# Patient Record
Sex: MALE | Race: BLACK OR AFRICAN AMERICAN | NOT HISPANIC OR LATINO | Employment: UNEMPLOYED | URBAN - METROPOLITAN AREA
[De-identification: names, ages, dates, MRNs, and addresses within clinical notes are randomized per-mention and may not be internally consistent; named-entity substitution may affect disease eponyms.]

---

## 2018-04-16 ENCOUNTER — HOSPITAL ENCOUNTER (EMERGENCY)
Facility: HOSPITAL | Age: 33
Discharge: HOME | End: 2018-04-17
Attending: EMERGENCY MEDICINE
Payer: MEDICAID

## 2018-04-16 DIAGNOSIS — R03.0 ELEVATED BLOOD PRESSURE READING: ICD-10-CM

## 2018-04-16 DIAGNOSIS — R19.7 DIARRHEA, UNSPECIFIED TYPE: Primary | ICD-10-CM

## 2018-04-16 DIAGNOSIS — R10.9 ABDOMINAL CRAMPS: ICD-10-CM

## 2018-04-16 PROCEDURE — 99283 EMERGENCY DEPT VISIT LOW MDM: CPT

## 2018-04-17 VITALS
HEIGHT: 71 IN | RESPIRATION RATE: 15 BRPM | OXYGEN SATURATION: 100 % | SYSTOLIC BLOOD PRESSURE: 177 MMHG | BODY MASS INDEX: 32.2 KG/M2 | HEART RATE: 66 BPM | WEIGHT: 230 LBS | TEMPERATURE: 99 F | DIASTOLIC BLOOD PRESSURE: 104 MMHG

## 2018-04-17 LAB
BILIRUB UR QL STRIP: NEGATIVE
C TRACH DNA SPEC QL NAA+PROBE: DETECTED
C TRACH DNA SPEC QL NAA+PROBE: DETECTED
CLARITY UR: CLEAR
COLOR UR: YELLOW
GLUCOSE UR QL STRIP: NEGATIVE
HGB UR QL STRIP: NEGATIVE
KETONES UR QL STRIP: NEGATIVE
LEUKOCYTE ESTERASE UR QL STRIP: NEGATIVE
N GONORRHOEA DNA SPEC QL NAA+PROBE: NOT DETECTED
NITRITE UR QL STRIP: NEGATIVE
PH UR STRIP: 6 [PH] (ref 5–8)
PROT UR QL STRIP: NEGATIVE
SP GR UR STRIP: 1.02 (ref 1–1.03)
URN SPEC COLLECT METH UR: NORMAL
UROBILINOGEN UR STRIP-ACNC: NEGATIVE EU/DL

## 2018-04-17 PROCEDURE — 87491 CHLMYD TRACH DNA AMP PROBE: CPT

## 2018-04-17 PROCEDURE — 81003 URINALYSIS AUTO W/O SCOPE: CPT

## 2018-04-17 PROCEDURE — 25000003 PHARM REV CODE 250: Performed by: EMERGENCY MEDICINE

## 2018-04-17 RX ORDER — DIPHENOXYLATE HYDROCHLORIDE AND ATROPINE SULFATE 2.5; .025 MG/1; MG/1
1 TABLET ORAL 4 TIMES DAILY PRN
Qty: 12 TABLET | Refills: 1 | Status: SHIPPED | OUTPATIENT
Start: 2018-04-17 | End: 2018-04-27

## 2018-04-17 RX ORDER — DIPHENOXYLATE HYDROCHLORIDE AND ATROPINE SULFATE 2.5; .025 MG/1; MG/1
1 TABLET ORAL
Status: COMPLETED | OUTPATIENT
Start: 2018-04-17 | End: 2018-04-17

## 2018-04-17 RX ORDER — DICYCLOMINE HYDROCHLORIDE 10 MG/1
20 CAPSULE ORAL
Status: COMPLETED | OUTPATIENT
Start: 2018-04-17 | End: 2018-04-17

## 2018-04-17 RX ORDER — DICYCLOMINE HYDROCHLORIDE 20 MG/1
20 TABLET ORAL EVERY 6 HOURS
Qty: 30 TABLET | Refills: 0 | Status: SHIPPED | OUTPATIENT
Start: 2018-04-17 | End: 2019-05-07 | Stop reason: DRUGHIGH

## 2018-04-17 RX ADMIN — DICYCLOMINE HYDROCHLORIDE 20 MG: 10 CAPSULE ORAL at 01:04

## 2018-04-17 RX ADMIN — DIPHENOXYLATE HYDROCHLORIDE AND ATROPINE SULFATE 1 TABLET: 2.5; .025 TABLET ORAL at 01:04

## 2018-04-17 NOTE — ED NOTES
"Pt presents to ED c/o multiple episode of diarrhea X2-3 days. Pt reports "discomfort" with urination X 2 weeks. Pt denies nausea/ vomiting. Pt reports abd pain to lower abd intermittently throughout the day, states present "when it bubbles, when I have to go to the bathroom".   "

## 2018-04-17 NOTE — ED PROVIDER NOTES
Encounter Date: 4/16/2018    SCRIBE #1 NOTE: I, Sierra Rowland, am scribing for, and in the presence of, Dr. Camejo.       History     Chief Complaint   Patient presents with    Diarrhea     abdominal cramping with diarrhea x4 days.      Mario Heller is a 33 y.o. male who  has no past medical history on file.    CC: diarrhea    The patient presents to the ED due to watery, non bloody diarrhea for 4 days, worse tonight.  Pt states he has been having to go to the restroom at work all day today.  He reports associated oscasional abdominal cramping, usually right before he needs to go to the restroom.  He denies any fever, n/v or any other symptoms.  He states he has been drinking a lot of Gatorade and water.  He states no one else at home has the same symptoms.  He denies any recent travel.  No other complaints at this time.        The history is provided by the patient.     Review of patient's allergies indicates:  No Known Allergies  History reviewed. No pertinent past medical history.  History reviewed. No pertinent surgical history.  History reviewed. No pertinent family history.  Social History   Substance Use Topics    Smoking status: Former Smoker     Packs/day: 0.50    Smokeless tobacco: Not on file    Alcohol use No     Review of Systems   Constitutional: Negative for fever.   HENT: Negative for sore throat.    Respiratory: Negative for cough and shortness of breath.    Cardiovascular: Negative for chest pain.   Gastrointestinal: Positive for abdominal pain and diarrhea. Negative for blood in stool, nausea and vomiting.   Genitourinary: Positive for dysuria (occasional. mild). Negative for discharge.   Musculoskeletal: Negative for back pain.   Neurological: Negative for dizziness and weakness.       Physical Exam     Initial Vitals [04/16/18 2220]   BP Pulse Resp Temp SpO2   (!) 163/81 69 20 98 °F (36.7 °C) 97 %      MAP       108.33         Physical Exam    Nursing note and vitals  reviewed.  Constitutional: He appears well-developed and well-nourished. He is not diaphoretic. No distress.   HENT:   Head: Normocephalic and atraumatic.   Eyes: Conjunctivae are normal.   Neck: Normal range of motion.   Cardiovascular: Normal rate, regular rhythm and normal heart sounds.   Pulmonary/Chest: Breath sounds normal. No respiratory distress.   Abdominal: Soft. Bowel sounds are normal. There is no tenderness.   Musculoskeletal: Normal range of motion. He exhibits no edema or tenderness.   Neurological: He is alert and oriented to person, place, and time.   Skin: Skin is warm and dry.   Psychiatric: He has a normal mood and affect.         ED Course   Procedures  Labs Reviewed   C. TRACHOMATIS/N. GONORRHOEAE BY AMP DNA   CHLAMYDIA TRACHOMATIS, DNA, AMP PROBE   C. TRACHOMATIS/N. GONORRHOEAE BY AMP DNA   URINALYSIS             Medical Decision Making:   Initial Assessment:   33 y.o. male presents with diarrhea for 4 days.  Denies n/v. Exam benign.    Clinical Tests:   Lab Tests: Ordered and Reviewed  ED Management:  Will order UA and take orthostatic BP.  Will give bentyl and lomotil and reassess.    Patient's urinalysis was completely normal.  There was no evidence of a UTI.  Urine was sent for GC and chlamydia since patient does report mild dysuria.  Patient has not had diarrhea while in the ED.  He will be given Bentyl and Lomotil here and discharged on these medications.  Patient's blood pressure was elevated.  He was advised to check his blood pressure daily and record.  He will be referred to primary care physician for further evaluation.  He has no evidence of end organ damage.     Labs are unremarkable.  Pt will be d/c home on lomotil and bentyl.                        Clinical Impression:   The primary encounter diagnosis was Diarrhea, unspecified type. Diagnoses of Abdominal cramps and Elevated blood pressure reading were also pertinent to this visit.    Disposition:   Disposition:  Discharged  Condition: Stable            I, Dr. Graciela Camejo, personally performed the services described in this documentation. All medical record entries made by the scribe were at my direction and in my presence.  I have reviewed the chart and agree that the record reflects my personal performance and is accurate and complete. Graciela Camejo MD.  3:27 AM 04/17/2018                 Graciela Camejo MD  04/17/18 0327       Graciela Camejo MD  04/20/18 1047

## 2018-04-17 NOTE — ED NOTES
"Pt inquires if testing has come back normal and asks, "So I don't have a STD?" Pt informed this testing was not performed.   "

## 2018-04-18 NOTE — PROGRESS NOTES
Pt +chlamydia. Pt was not treated in the ED.  Pt notified of results by phone and he reports he will f/u with [facility].

## 2019-05-07 ENCOUNTER — HOSPITAL ENCOUNTER (INPATIENT)
Facility: HOSPITAL | Age: 34
LOS: 2 days | Discharge: HOME | DRG: 392 | End: 2019-05-09
Attending: EMERGENCY MEDICINE | Admitting: FAMILY MEDICINE
Payer: MEDICAID

## 2019-05-07 DIAGNOSIS — R50.81 FEVER IN OTHER DISEASES: ICD-10-CM

## 2019-05-07 DIAGNOSIS — R10.84 GENERALIZED ABDOMINAL PAIN: ICD-10-CM

## 2019-05-07 DIAGNOSIS — K52.9 COLITIS: ICD-10-CM

## 2019-05-07 DIAGNOSIS — R07.9 CHEST PAIN: ICD-10-CM

## 2019-05-07 DIAGNOSIS — R19.7 VOMITING AND DIARRHEA: ICD-10-CM

## 2019-05-07 DIAGNOSIS — K52.9 ACUTE COLITIS: Primary | ICD-10-CM

## 2019-05-07 DIAGNOSIS — F19.10 DRUG ABUSE: ICD-10-CM

## 2019-05-07 DIAGNOSIS — R11.10 VOMITING AND DIARRHEA: ICD-10-CM

## 2019-05-07 LAB
ALBUMIN SERPL BCP-MCNC: 4.2 G/DL (ref 3.5–5.2)
ALP SERPL-CCNC: 67 U/L (ref 55–135)
ALT SERPL W/O P-5'-P-CCNC: 17 U/L (ref 10–44)
ANION GAP SERPL CALC-SCNC: 10 MMOL/L (ref 8–16)
AST SERPL-CCNC: 23 U/L (ref 10–40)
BASOPHILS # BLD AUTO: 0 K/UL (ref 0–0.2)
BASOPHILS NFR BLD: 0 % (ref 0–1.9)
BILIRUB SERPL-MCNC: 0.7 MG/DL (ref 0.1–1)
BUN SERPL-MCNC: 13 MG/DL (ref 6–20)
CALCIUM SERPL-MCNC: 9.5 MG/DL (ref 8.7–10.5)
CHLORIDE SERPL-SCNC: 103 MMOL/L (ref 95–110)
CO2 SERPL-SCNC: 25 MMOL/L (ref 23–29)
CREAT SERPL-MCNC: 1.2 MG/DL (ref 0.5–1.4)
DIFFERENTIAL METHOD: ABNORMAL
EOSINOPHIL # BLD AUTO: 0 K/UL (ref 0–0.5)
EOSINOPHIL NFR BLD: 0 % (ref 0–8)
ERYTHROCYTE [DISTWIDTH] IN BLOOD BY AUTOMATED COUNT: 13.7 % (ref 11.5–14.5)
EST. GFR  (AFRICAN AMERICAN): >60 ML/MIN/1.73 M^2
EST. GFR  (NON AFRICAN AMERICAN): >60 ML/MIN/1.73 M^2
GLUCOSE SERPL-MCNC: 94 MG/DL (ref 70–110)
HCT VFR BLD AUTO: 45.1 % (ref 40–54)
HGB BLD-MCNC: 15.3 G/DL (ref 14–18)
LACTATE SERPL-SCNC: 1.2 MMOL/L (ref 0.5–2.2)
LYMPHOCYTES # BLD AUTO: 0.8 K/UL (ref 1–4.8)
LYMPHOCYTES NFR BLD: 12.6 % (ref 18–48)
MCH RBC QN AUTO: 32.1 PG (ref 27–31)
MCHC RBC AUTO-ENTMCNC: 33.9 G/DL (ref 32–36)
MCV RBC AUTO: 95 FL (ref 82–98)
MONOCYTES # BLD AUTO: 0.2 K/UL (ref 0.3–1)
MONOCYTES NFR BLD: 3.8 % (ref 4–15)
NEUTROPHILS # BLD AUTO: 5.1 K/UL (ref 1.8–7.7)
NEUTROPHILS NFR BLD: 83.4 % (ref 38–73)
PLATELET # BLD AUTO: 113 K/UL (ref 150–350)
PMV BLD AUTO: 12.6 FL (ref 9.2–12.9)
POTASSIUM SERPL-SCNC: 4.5 MMOL/L (ref 3.5–5.1)
PROT SERPL-MCNC: 7.3 G/DL (ref 6–8.4)
RBC # BLD AUTO: 4.77 M/UL (ref 4.6–6.2)
SODIUM SERPL-SCNC: 138 MMOL/L (ref 136–145)
TROPONIN I SERPL DL<=0.01 NG/ML-MCNC: <0.006 NG/ML (ref 0–0.03)
WBC # BLD AUTO: 6.1 K/UL (ref 3.9–12.7)

## 2019-05-07 PROCEDURE — G0378 HOSPITAL OBSERVATION PER HR: HCPCS

## 2019-05-07 PROCEDURE — 96365 THER/PROPH/DIAG IV INF INIT: CPT

## 2019-05-07 PROCEDURE — 12000002 HC ACUTE/MED SURGE SEMI-PRIVATE ROOM

## 2019-05-07 PROCEDURE — 84484 ASSAY OF TROPONIN QUANT: CPT

## 2019-05-07 PROCEDURE — 83605 ASSAY OF LACTIC ACID: CPT

## 2019-05-07 PROCEDURE — 25000003 PHARM REV CODE 250: Performed by: EMERGENCY MEDICINE

## 2019-05-07 PROCEDURE — 93010 ELECTROCARDIOGRAM REPORT: CPT | Mod: ,,, | Performed by: STUDENT IN AN ORGANIZED HEALTH CARE EDUCATION/TRAINING PROGRAM

## 2019-05-07 PROCEDURE — 93005 ELECTROCARDIOGRAM TRACING: CPT

## 2019-05-07 PROCEDURE — 93010 EKG 12-LEAD: ICD-10-PCS | Mod: ,,, | Performed by: STUDENT IN AN ORGANIZED HEALTH CARE EDUCATION/TRAINING PROGRAM

## 2019-05-07 PROCEDURE — 99285 EMERGENCY DEPT VISIT HI MDM: CPT | Mod: 25

## 2019-05-07 PROCEDURE — 96361 HYDRATE IV INFUSION ADD-ON: CPT

## 2019-05-07 PROCEDURE — 85025 COMPLETE CBC W/AUTO DIFF WBC: CPT

## 2019-05-07 PROCEDURE — 80053 COMPREHEN METABOLIC PANEL: CPT

## 2019-05-07 PROCEDURE — 25500020 PHARM REV CODE 255: Performed by: EMERGENCY MEDICINE

## 2019-05-07 PROCEDURE — 87040 BLOOD CULTURE FOR BACTERIA: CPT

## 2019-05-07 PROCEDURE — 96375 TX/PRO/DX INJ NEW DRUG ADDON: CPT

## 2019-05-07 PROCEDURE — 63600175 PHARM REV CODE 636 W HCPCS: Performed by: EMERGENCY MEDICINE

## 2019-05-07 PROCEDURE — S0030 INJECTION, METRONIDAZOLE: HCPCS | Performed by: EMERGENCY MEDICINE

## 2019-05-07 RX ORDER — ONDANSETRON 2 MG/ML
4 INJECTION INTRAMUSCULAR; INTRAVENOUS
Status: COMPLETED | OUTPATIENT
Start: 2019-05-07 | End: 2019-05-07

## 2019-05-07 RX ORDER — ACETAMINOPHEN 500 MG
1000 TABLET ORAL
Status: COMPLETED | OUTPATIENT
Start: 2019-05-07 | End: 2019-05-07

## 2019-05-07 RX ORDER — ONDANSETRON 4 MG/1
4 TABLET, FILM COATED ORAL EVERY 8 HOURS PRN
Qty: 12 TABLET | Refills: 0 | Status: SHIPPED | OUTPATIENT
Start: 2019-05-07 | End: 2019-05-09 | Stop reason: SDUPTHER

## 2019-05-07 RX ORDER — KETOROLAC TROMETHAMINE 30 MG/ML
15 INJECTION, SOLUTION INTRAMUSCULAR; INTRAVENOUS
Status: COMPLETED | OUTPATIENT
Start: 2019-05-07 | End: 2019-05-07

## 2019-05-07 RX ORDER — DICYCLOMINE HYDROCHLORIDE 20 MG/1
20 TABLET ORAL 4 TIMES DAILY PRN
Qty: 20 TABLET | Refills: 0 | Status: SHIPPED | OUTPATIENT
Start: 2019-05-07 | End: 2019-05-09 | Stop reason: SDUPTHER

## 2019-05-07 RX ORDER — CIPROFLOXACIN 2 MG/ML
400 INJECTION, SOLUTION INTRAVENOUS
Status: COMPLETED | OUTPATIENT
Start: 2019-05-07 | End: 2019-05-08

## 2019-05-07 RX ORDER — HYOSCYAMINE SULFATE 0.5 MG/ML
0.5 INJECTION, SOLUTION SUBCUTANEOUS
Status: COMPLETED | OUTPATIENT
Start: 2019-05-07 | End: 2019-05-07

## 2019-05-07 RX ORDER — ACETAMINOPHEN 500 MG
500 TABLET ORAL EVERY 6 HOURS PRN
Qty: 28 TABLET | Refills: 0 | Status: SHIPPED | OUTPATIENT
Start: 2019-05-07

## 2019-05-07 RX ORDER — METRONIDAZOLE 500 MG/100ML
500 INJECTION, SOLUTION INTRAVENOUS
Status: COMPLETED | OUTPATIENT
Start: 2019-05-07 | End: 2019-05-08

## 2019-05-07 RX ORDER — DEXTROSE MONOHYDRATE AND SODIUM CHLORIDE 5; .9 G/100ML; G/100ML
1000 INJECTION, SOLUTION INTRAVENOUS
Status: COMPLETED | OUTPATIENT
Start: 2019-05-07 | End: 2019-05-08

## 2019-05-07 RX ADMIN — KETOROLAC TROMETHAMINE 15 MG: 30 INJECTION, SOLUTION INTRAMUSCULAR at 09:05

## 2019-05-07 RX ADMIN — METRONIDAZOLE 500 MG: 500 INJECTION, SOLUTION INTRAVENOUS at 11:05

## 2019-05-07 RX ADMIN — SODIUM CHLORIDE 1000 ML: 0.9 INJECTION, SOLUTION INTRAVENOUS at 09:05

## 2019-05-07 RX ADMIN — SODIUM CHLORIDE 1000 ML: 0.9 INJECTION, SOLUTION INTRAVENOUS at 08:05

## 2019-05-07 RX ADMIN — ONDANSETRON 4 MG: 2 INJECTION INTRAMUSCULAR; INTRAVENOUS at 08:05

## 2019-05-07 RX ADMIN — ACETAMINOPHEN 1000 MG: 500 TABLET ORAL at 11:05

## 2019-05-07 RX ADMIN — IOHEXOL 100 ML: 350 INJECTION, SOLUTION INTRAVENOUS at 09:05

## 2019-05-07 RX ADMIN — HYOSCYAMINE SULFATE 0.5 MG: 0.5 INJECTION, SOLUTION SUBCUTANEOUS at 08:05

## 2019-05-07 RX ADMIN — PROMETHAZINE HYDROCHLORIDE 12.5 MG: 25 INJECTION INTRAMUSCULAR; INTRAVENOUS at 09:05

## 2019-05-07 RX ADMIN — SODIUM CHLORIDE 1000 ML: 0.9 INJECTION, SOLUTION INTRAVENOUS at 11:05

## 2019-05-08 ENCOUNTER — CLINICAL SUPPORT (OUTPATIENT)
Dept: SMOKING CESSATION | Facility: CLINIC | Age: 34
End: 2019-05-08
Payer: MEDICAID

## 2019-05-08 DIAGNOSIS — F17.210 CIGARETTE SMOKER: Primary | ICD-10-CM

## 2019-05-08 PROBLEM — D69.6 THROMBOCYTOPENIA: Status: ACTIVE | Noted: 2019-05-08

## 2019-05-08 PROBLEM — N17.9 AKI (ACUTE KIDNEY INJURY): Status: ACTIVE | Noted: 2019-05-08

## 2019-05-08 PROBLEM — R19.7 VOMITING AND DIARRHEA: Status: ACTIVE | Noted: 2019-05-08

## 2019-05-08 PROBLEM — F17.200 TOBACCO USE DISORDER: Status: ACTIVE | Noted: 2019-05-08

## 2019-05-08 PROBLEM — R11.10 VOMITING AND DIARRHEA: Status: ACTIVE | Noted: 2019-05-08

## 2019-05-08 LAB
ALBUMIN SERPL BCP-MCNC: 3.3 G/DL (ref 3.5–5.2)
ALP SERPL-CCNC: 54 U/L (ref 55–135)
ALT SERPL W/O P-5'-P-CCNC: 13 U/L (ref 10–44)
AMPHET+METHAMPHET UR QL: NEGATIVE
ANION GAP SERPL CALC-SCNC: 9 MMOL/L (ref 8–16)
APTT BLDCRRT: 36.6 SEC (ref 21–32)
AST SERPL-CCNC: 19 U/L (ref 10–40)
BARBITURATES UR QL SCN>200 NG/ML: NEGATIVE
BASOPHILS # BLD AUTO: 0 K/UL (ref 0–0.2)
BASOPHILS NFR BLD: 0 % (ref 0–1.9)
BENZODIAZ UR QL SCN>200 NG/ML: NEGATIVE
BILIRUB SERPL-MCNC: 0.5 MG/DL (ref 0.1–1)
BILIRUB UR QL STRIP: NEGATIVE
BUN SERPL-MCNC: 10 MG/DL (ref 6–20)
BZE UR QL SCN: NORMAL
C DIFF GDH STL QL: NEGATIVE
C DIFF TOX A+B STL QL IA: NEGATIVE
CALCIUM SERPL-MCNC: 8.3 MG/DL (ref 8.7–10.5)
CANNABINOIDS UR QL SCN: NORMAL
CHLORIDE SERPL-SCNC: 107 MMOL/L (ref 95–110)
CLARITY UR: CLEAR
CO2 SERPL-SCNC: 22 MMOL/L (ref 23–29)
COLOR UR: YELLOW
CREAT SERPL-MCNC: 1.1 MG/DL (ref 0.5–1.4)
CREAT UR-MCNC: 155.5 MG/DL (ref 23–375)
DIFFERENTIAL METHOD: ABNORMAL
EOSINOPHIL # BLD AUTO: 0 K/UL (ref 0–0.5)
EOSINOPHIL NFR BLD: 0 % (ref 0–8)
ERYTHROCYTE [DISTWIDTH] IN BLOOD BY AUTOMATED COUNT: 13.8 % (ref 11.5–14.5)
EST. GFR  (AFRICAN AMERICAN): >60 ML/MIN/1.73 M^2
EST. GFR  (NON AFRICAN AMERICAN): >60 ML/MIN/1.73 M^2
ESTIMATED AVG GLUCOSE: 103 MG/DL (ref 68–131)
GLUCOSE SERPL-MCNC: 90 MG/DL (ref 70–110)
GLUCOSE UR QL STRIP: NEGATIVE
HBA1C MFR BLD HPLC: 5.2 % (ref 4–5.6)
HCT VFR BLD AUTO: 41.8 % (ref 40–54)
HGB BLD-MCNC: 14 G/DL (ref 14–18)
HGB UR QL STRIP: NEGATIVE
INR PPP: 1.3 (ref 0.8–1.2)
KETONES UR QL STRIP: NEGATIVE
LEUKOCYTE ESTERASE UR QL STRIP: NEGATIVE
LIPASE SERPL-CCNC: 8 U/L (ref 4–60)
LYMPHOCYTES # BLD AUTO: 0.6 K/UL (ref 1–4.8)
LYMPHOCYTES NFR BLD: 15.7 % (ref 18–48)
MAGNESIUM SERPL-MCNC: 1.4 MG/DL (ref 1.6–2.6)
MCH RBC QN AUTO: 31.7 PG (ref 27–31)
MCHC RBC AUTO-ENTMCNC: 33.5 G/DL (ref 32–36)
MCV RBC AUTO: 95 FL (ref 82–98)
METHADONE UR QL SCN>300 NG/ML: NEGATIVE
MONOCYTES # BLD AUTO: 0.3 K/UL (ref 0.3–1)
MONOCYTES NFR BLD: 7.6 % (ref 4–15)
NEUTROPHILS # BLD AUTO: 2.8 K/UL (ref 1.8–7.7)
NEUTROPHILS NFR BLD: 76.7 % (ref 38–73)
NITRITE UR QL STRIP: NEGATIVE
OB PNL STL: POSITIVE
OPIATES UR QL SCN: NEGATIVE
PCP UR QL SCN>25 NG/ML: NEGATIVE
PH UR STRIP: 6 [PH] (ref 5–8)
PHOSPHATE SERPL-MCNC: 2.5 MG/DL (ref 2.7–4.5)
PLATELET # BLD AUTO: 118 K/UL (ref 150–350)
PMV BLD AUTO: 13.2 FL (ref 9.2–12.9)
POTASSIUM SERPL-SCNC: 3.5 MMOL/L (ref 3.5–5.1)
PROCALCITONIN SERPL IA-MCNC: 0.08 NG/ML
PROT SERPL-MCNC: 6.2 G/DL (ref 6–8.4)
PROT UR QL STRIP: NEGATIVE
PROTHROMBIN TIME: 13.3 SEC (ref 9–12.5)
RBC # BLD AUTO: 4.42 M/UL (ref 4.6–6.2)
SODIUM SERPL-SCNC: 138 MMOL/L (ref 136–145)
SP GR UR STRIP: <=1.005 (ref 1–1.03)
T4 FREE SERPL-MCNC: 0.89 NG/DL (ref 0.71–1.51)
TOXICOLOGY INFORMATION: NORMAL
TSH SERPL DL<=0.005 MIU/L-ACNC: 0.34 UIU/ML (ref 0.4–4)
URN SPEC COLLECT METH UR: ABNORMAL
UROBILINOGEN UR STRIP-ACNC: NEGATIVE EU/DL
WBC # BLD AUTO: 3.69 K/UL (ref 3.9–12.7)
WBC #/AREA STL HPF: ABNORMAL /[HPF]

## 2019-05-08 PROCEDURE — 63600175 PHARM REV CODE 636 W HCPCS: Performed by: EMERGENCY MEDICINE

## 2019-05-08 PROCEDURE — 96376 TX/PRO/DX INJ SAME DRUG ADON: CPT

## 2019-05-08 PROCEDURE — 25000003 PHARM REV CODE 250: Performed by: EMERGENCY MEDICINE

## 2019-05-08 PROCEDURE — 81003 URINALYSIS AUTO W/O SCOPE: CPT | Mod: 59

## 2019-05-08 PROCEDURE — 96375 TX/PRO/DX INJ NEW DRUG ADDON: CPT

## 2019-05-08 PROCEDURE — S0030 INJECTION, METRONIDAZOLE: HCPCS | Performed by: STUDENT IN AN ORGANIZED HEALTH CARE EDUCATION/TRAINING PROGRAM

## 2019-05-08 PROCEDURE — 84439 ASSAY OF FREE THYROXINE: CPT

## 2019-05-08 PROCEDURE — 84145 PROCALCITONIN (PCT): CPT

## 2019-05-08 PROCEDURE — 80053 COMPREHEN METABOLIC PANEL: CPT

## 2019-05-08 PROCEDURE — 85610 PROTHROMBIN TIME: CPT

## 2019-05-08 PROCEDURE — 89055 LEUKOCYTE ASSESSMENT FECAL: CPT

## 2019-05-08 PROCEDURE — 96367 TX/PROPH/DG ADDL SEQ IV INF: CPT

## 2019-05-08 PROCEDURE — 87427 SHIGA-LIKE TOXIN AG IA: CPT | Mod: 59

## 2019-05-08 PROCEDURE — 94761 N-INVAS EAR/PLS OXIMETRY MLT: CPT

## 2019-05-08 PROCEDURE — 63600175 PHARM REV CODE 636 W HCPCS: Performed by: STUDENT IN AN ORGANIZED HEALTH CARE EDUCATION/TRAINING PROGRAM

## 2019-05-08 PROCEDURE — 11000001 HC ACUTE MED/SURG PRIVATE ROOM

## 2019-05-08 PROCEDURE — 80307 DRUG TEST PRSMV CHEM ANLYZR: CPT

## 2019-05-08 PROCEDURE — C9113 INJ PANTOPRAZOLE SODIUM, VIA: HCPCS | Performed by: STUDENT IN AN ORGANIZED HEALTH CARE EDUCATION/TRAINING PROGRAM

## 2019-05-08 PROCEDURE — 97161 PT EVAL LOW COMPLEX 20 MIN: CPT

## 2019-05-08 PROCEDURE — 83516 IMMUNOASSAY NONANTIBODY: CPT

## 2019-05-08 PROCEDURE — 92610 EVALUATE SWALLOWING FUNCTION: CPT

## 2019-05-08 PROCEDURE — 97165 OT EVAL LOW COMPLEX 30 MIN: CPT

## 2019-05-08 PROCEDURE — 83735 ASSAY OF MAGNESIUM: CPT

## 2019-05-08 PROCEDURE — 84443 ASSAY THYROID STIM HORMONE: CPT

## 2019-05-08 PROCEDURE — 99406 BEHAV CHNG SMOKING 3-10 MIN: CPT | Mod: S$GLB,,,

## 2019-05-08 PROCEDURE — 87045 FECES CULTURE AEROBIC BACT: CPT

## 2019-05-08 PROCEDURE — 87046 STOOL CULTR AEROBIC BACT EA: CPT

## 2019-05-08 PROCEDURE — 85730 THROMBOPLASTIN TIME PARTIAL: CPT

## 2019-05-08 PROCEDURE — 83690 ASSAY OF LIPASE: CPT

## 2019-05-08 PROCEDURE — 96366 THER/PROPH/DIAG IV INF ADDON: CPT

## 2019-05-08 PROCEDURE — 84100 ASSAY OF PHOSPHORUS: CPT

## 2019-05-08 PROCEDURE — 25000003 PHARM REV CODE 250: Performed by: STUDENT IN AN ORGANIZED HEALTH CARE EDUCATION/TRAINING PROGRAM

## 2019-05-08 PROCEDURE — 83036 HEMOGLOBIN GLYCOSYLATED A1C: CPT

## 2019-05-08 PROCEDURE — 36415 COLL VENOUS BLD VENIPUNCTURE: CPT

## 2019-05-08 PROCEDURE — 99406 PT REFUSED TOBACCO CESSATION: ICD-10-PCS | Mod: S$GLB,,,

## 2019-05-08 PROCEDURE — 96361 HYDRATE IV INFUSION ADD-ON: CPT

## 2019-05-08 PROCEDURE — 85025 COMPLETE CBC W/AUTO DIFF WBC: CPT

## 2019-05-08 PROCEDURE — 87209 SMEAR COMPLEX STAIN: CPT

## 2019-05-08 PROCEDURE — 87449 NOS EACH ORGANISM AG IA: CPT

## 2019-05-08 PROCEDURE — 82272 OCCULT BLD FECES 1-3 TESTS: CPT

## 2019-05-08 RX ORDER — IBUPROFEN 200 MG
16 TABLET ORAL
Status: DISCONTINUED | OUTPATIENT
Start: 2019-05-08 | End: 2019-05-09 | Stop reason: HOSPADM

## 2019-05-08 RX ORDER — DEXTROSE 50 % IN WATER (D50W) INTRAVENOUS SYRINGE
12.5
Status: DISCONTINUED | OUTPATIENT
Start: 2019-05-08 | End: 2019-05-09 | Stop reason: HOSPADM

## 2019-05-08 RX ORDER — SODIUM CHLORIDE 0.9 % (FLUSH) 0.9 %
5 SYRINGE (ML) INJECTION
Status: DISCONTINUED | OUTPATIENT
Start: 2019-05-08 | End: 2019-05-09 | Stop reason: HOSPADM

## 2019-05-08 RX ORDER — DEXTROSE 50 % IN WATER (D50W) INTRAVENOUS SYRINGE
25
Status: DISCONTINUED | OUTPATIENT
Start: 2019-05-08 | End: 2019-05-09 | Stop reason: HOSPADM

## 2019-05-08 RX ORDER — DICYCLOMINE HYDROCHLORIDE 20 MG/1
20 TABLET ORAL
Status: DISCONTINUED | OUTPATIENT
Start: 2019-05-08 | End: 2019-05-09 | Stop reason: HOSPADM

## 2019-05-08 RX ORDER — PANTOPRAZOLE SODIUM 40 MG/10ML
40 INJECTION, POWDER, LYOPHILIZED, FOR SOLUTION INTRAVENOUS DAILY
Status: DISCONTINUED | OUTPATIENT
Start: 2019-05-08 | End: 2019-05-09 | Stop reason: HOSPADM

## 2019-05-08 RX ORDER — CIPROFLOXACIN 2 MG/ML
400 INJECTION, SOLUTION INTRAVENOUS
Status: DISCONTINUED | OUTPATIENT
Start: 2019-05-08 | End: 2019-05-09 | Stop reason: HOSPADM

## 2019-05-08 RX ORDER — GLUCAGON 1 MG
1 KIT INJECTION
Status: DISCONTINUED | OUTPATIENT
Start: 2019-05-08 | End: 2019-05-09 | Stop reason: HOSPADM

## 2019-05-08 RX ORDER — INSULIN ASPART 100 [IU]/ML
0-5 INJECTION, SOLUTION INTRAVENOUS; SUBCUTANEOUS
Status: DISCONTINUED | OUTPATIENT
Start: 2019-05-08 | End: 2019-05-09 | Stop reason: HOSPADM

## 2019-05-08 RX ORDER — ADHESIVE BANDAGE
30 BANDAGE TOPICAL DAILY
Status: DISCONTINUED | OUTPATIENT
Start: 2019-05-08 | End: 2019-05-08

## 2019-05-08 RX ORDER — ACETAMINOPHEN 325 MG/1
650 TABLET ORAL EVERY 8 HOURS PRN
Status: DISCONTINUED | OUTPATIENT
Start: 2019-05-08 | End: 2019-05-08

## 2019-05-08 RX ORDER — SODIUM CHLORIDE, SODIUM LACTATE, POTASSIUM CHLORIDE, CALCIUM CHLORIDE 600; 310; 30; 20 MG/100ML; MG/100ML; MG/100ML; MG/100ML
INJECTION, SOLUTION INTRAVENOUS CONTINUOUS
Status: DISCONTINUED | OUTPATIENT
Start: 2019-05-08 | End: 2019-05-08

## 2019-05-08 RX ORDER — SODIUM CHLORIDE, SODIUM LACTATE, POTASSIUM CHLORIDE, CALCIUM CHLORIDE 600; 310; 30; 20 MG/100ML; MG/100ML; MG/100ML; MG/100ML
INJECTION, SOLUTION INTRAVENOUS CONTINUOUS
Status: ACTIVE | OUTPATIENT
Start: 2019-05-08 | End: 2019-05-08

## 2019-05-08 RX ORDER — IBUPROFEN 200 MG
24 TABLET ORAL
Status: DISCONTINUED | OUTPATIENT
Start: 2019-05-08 | End: 2019-05-09 | Stop reason: HOSPADM

## 2019-05-08 RX ORDER — RAMELTEON 8 MG/1
8 TABLET ORAL NIGHTLY PRN
Status: DISCONTINUED | OUTPATIENT
Start: 2019-05-08 | End: 2019-05-09 | Stop reason: HOSPADM

## 2019-05-08 RX ORDER — ACETAMINOPHEN 325 MG/1
650 TABLET ORAL EVERY 8 HOURS PRN
Status: DISCONTINUED | OUTPATIENT
Start: 2019-05-08 | End: 2019-05-09 | Stop reason: HOSPADM

## 2019-05-08 RX ORDER — METRONIDAZOLE 500 MG/100ML
500 INJECTION, SOLUTION INTRAVENOUS
Status: DISCONTINUED | OUTPATIENT
Start: 2019-05-08 | End: 2019-05-09 | Stop reason: HOSPADM

## 2019-05-08 RX ORDER — ONDANSETRON 8 MG/1
8 TABLET, ORALLY DISINTEGRATING ORAL EVERY 6 HOURS PRN
Status: DISCONTINUED | OUTPATIENT
Start: 2019-05-08 | End: 2019-05-09 | Stop reason: HOSPADM

## 2019-05-08 RX ORDER — ACETAMINOPHEN 325 MG/1
650 TABLET ORAL EVERY 4 HOURS PRN
Status: DISCONTINUED | OUTPATIENT
Start: 2019-05-08 | End: 2019-05-08 | Stop reason: ALTCHOICE

## 2019-05-08 RX ADMIN — SODIUM CHLORIDE, SODIUM LACTATE, POTASSIUM CHLORIDE, AND CALCIUM CHLORIDE 1000 ML: .6; .31; .03; .02 INJECTION, SOLUTION INTRAVENOUS at 02:05

## 2019-05-08 RX ADMIN — PANTOPRAZOLE SODIUM 40 MG: 40 INJECTION, POWDER, LYOPHILIZED, FOR SOLUTION INTRAVENOUS at 10:05

## 2019-05-08 RX ADMIN — THIAMINE HYDROCHLORIDE: 100 INJECTION, SOLUTION INTRAMUSCULAR; INTRAVENOUS at 04:05

## 2019-05-08 RX ADMIN — CIPROFLOXACIN 400 MG: 2 INJECTION, SOLUTION INTRAVENOUS at 12:05

## 2019-05-08 RX ADMIN — METRONIDAZOLE 500 MG: 500 INJECTION, SOLUTION INTRAVENOUS at 06:05

## 2019-05-08 RX ADMIN — SODIUM CHLORIDE, SODIUM LACTATE, POTASSIUM CHLORIDE, AND CALCIUM CHLORIDE: .6; .31; .03; .02 INJECTION, SOLUTION INTRAVENOUS at 10:05

## 2019-05-08 RX ADMIN — LACTOBACILLUS TAB 1 TABLET: TAB at 04:05

## 2019-05-08 RX ADMIN — LIDOCAINE HYDROCHLORIDE: 20 SOLUTION ORAL; TOPICAL at 02:05

## 2019-05-08 RX ADMIN — DICYCLOMINE HYDROCHLORIDE 20 MG: 20 TABLET ORAL at 12:05

## 2019-05-08 RX ADMIN — METRONIDAZOLE 500 MG: 500 INJECTION, SOLUTION INTRAVENOUS at 11:05

## 2019-05-08 RX ADMIN — LACTOBACILLUS TAB 1 TABLET: TAB at 12:05

## 2019-05-08 RX ADMIN — ACETAMINOPHEN 650 MG: 325 TABLET ORAL at 10:05

## 2019-05-08 RX ADMIN — METRONIDAZOLE 500 MG: 500 INJECTION, SOLUTION INTRAVENOUS at 04:05

## 2019-05-08 RX ADMIN — LACTOBACILLUS TAB 1 TABLET: TAB at 10:05

## 2019-05-08 RX ADMIN — DICYCLOMINE HYDROCHLORIDE 20 MG: 20 TABLET ORAL at 11:05

## 2019-05-08 RX ADMIN — DEXTROSE AND SODIUM CHLORIDE 1000 ML: 5; .9 INJECTION, SOLUTION INTRAVENOUS at 12:05

## 2019-05-08 RX ADMIN — DICYCLOMINE HYDROCHLORIDE 20 MG: 20 TABLET ORAL at 04:05

## 2019-05-08 NOTE — PROGRESS NOTES
Smoking cessation education and materials provided.  Pt reports nicotine withdrawal symptoms, however, declines the nicotine patch during hospital admission stating that he has used the patch in the past and it caused him to hallucinate. Pt referred to 1-800-QUIT-NOW for further smoking cessation resources following hospital discharge.

## 2019-05-08 NOTE — PLAN OF CARE
This  put name on white board and explained blue discharge folder to patient. Discharge planning brochure and/or business card given to patient.  Patient verbalized understanding.    TN met with patient. He states prior to arrival he was living at home. Pt states that he was independent and able to drive. Pts oscar's aunt is currently in room at bedside. Pt denies any needs for HH or DME. Pt states that his fiancee or brother will be bringing him home. Pt has no further questions at this time.      05/08/19 1014   Discharge Assessment   Assessment Type Discharge Planning Assessment   Confirmed/corrected address and phone number on facesheet? Yes   Assessment information obtained from? Patient;Caregiver;Medical Record   Expected Length of Stay (days) 2   Communicated expected length of stay with patient/caregiver yes   Prior to hospitilization cognitive status: Alert/Oriented   Prior to hospitalization functional status: Independent   Current cognitive status: Alert/Oriented   Current Functional Status: Independent   Facility Arrived From: ED   Lives With alone   Able to Return to Prior Arrangements yes   Who are your caregiver(s) and their phone number(s)? Edwin (brother) [phone]   Patient's perception of discharge disposition home or selfcare   Readmission Within the Last 30 Days no previous admission in last 30 days   Patient currently being followed by outpatient case management? No   Patient currently receives any other outside agency services? No   Equipment Currently Used at Home none   Do you have any problems affording any of your prescribed medications? No   Is the patient taking medications as prescribed? yes   Does the patient have transportation home? Yes   Transportation Anticipated family or friend will provide   Does the patient receive services at the [facility]? No   Discharge Plan A Home   DME Needed Upon Discharge  none   Patient/Family in Agreement with Plan yes

## 2019-05-08 NOTE — ED TRIAGE NOTES
Pt presented to ED via ambulance with c/o of vomiting , diarrhea , and fever , pt states after 3 days of drinking about a gallon in total of alcohol  ,1g of  cocaine and 2g  mariajuana on waking this am he felt sick to his stomach , pt took pepto bismol because pain increased , pt pain 8/10

## 2019-05-08 NOTE — PT/OT/SLP EVAL
Occupational Therapy   Evaluation and Discharge Note    Name: Mario Heller  MRN: [id number]  Admitting Diagnosis:  Acute colitis      Recommendations:     Discharge Recommendations: home  Discharge Equipment Recommendations:  none  Barriers to discharge:  None    Assessment:   Pt w/o signif change in fxnl status from baseline (I). No further OT svcs indicated at this time.    Mario Heller is a 34 y.o. male with a medical diagnosis of Acute colitis. At this time, patient is functioning at their prior level of function and does not require further acute OT services.     Plan:     During this hospitalization, patient does not require further acute OT services.  Please re-consult if situation changes.    · Plan of Care Reviewed with: patient, friend    Subjective     Chief Complaint: abd pain  Patient/Family Comments/goals: return home    Occupational Profile:  Living Environment: w/ friend in 1st fl apt w/ 3-4 ILIANA & 0HR; t/s combo  Previous level of function: (I) w/o DME  Roles and Routines: .  Equipment Used at home:  none  Assistance upon Discharge: friends    Pain/Comfort:  · Pain Rating 1: 9/10  · Location - Orientation 1: generalized  · Location 1: abdomen  · Pain Addressed 1: Reposition, Distraction, Cessation of Activity, Nurse notified  · Pain Rating Post-Intervention 1: 9/10  · Pain Rating 2: 9/10  · Location - Orientation 2: lower  · Location 2: back  · Pain Addressed 2: Reposition, Distraction, Cessation of Activity, Nurse notified(provided hot packs)    Patients cultural, spiritual, Yarsanism conflicts given the current situation:      Objective:     Communicated with: nsisaac prior to session.  Patient found right sidelying with bed alarm upon OT entry to room.    General Precautions: Standard, fall   Orthopedic Precautions:N/A   Braces: N/A     Occupational Performance:    Bed Mobility:    · Patient completed Supine to Sit with modified independence  · Patient completed Sit to Supine with modified  independence    Functional Mobility/Transfers:  · Patient completed Sit <> Stand Transfer with supervision  with  no assistive device   · Functional Mobility: w/o DME around room w/ Sup    Activities of Daily Living:  · Lower Body Dressing: modified independence don shorts & doff/don socks  · Toileting: modified independence standing    Cognitive/Visual Perceptual:  AOx4    Physical Exam:  BUEs WFL at 5/5    Sit balance: G  Stand balance: F+    AMPAC 6 Click ADL:  AMPAC Total Score: 24    Treatment & Education:    Education:  Pt found in R sidelying & agreeable to OT eval this AM. Pt lives w/ a friend in 1st fl apt w/ 3-4STE & 0HR; t/s combo. PLOF: (I) w/o DME w/ all fxnl tasks incl standing showers, IADLs & driving. Currently, pt c/o 8-9/10 abd & low back pain & perf all eval tasks w/ MI. Edu/tx re: general safety techs, deep/PLBing techs. Pt verbalized understanding. Pt returned to supine w/ bed alarm & nsg notified.    Patient left right sidelying with all lines intact, call button in reach, bed alarm on, nsg notified and friend present    GOALS:   Multidisciplinary Problems     Occupational Therapy Goals     Not on file          Multidisciplinary Problems (Resolved)        Problem: Occupational Therapy Goal    Goal Priority Disciplines Outcome Interventions   Occupational Therapy Goal   (Resolved)     OT, PT/OT Outcome(s) achieved                    History:     History reviewed. No pertinent past medical history.    History reviewed. No pertinent surgical history.    Time Tracking:     OT Date of Treatment: 05/08/19  OT Start Time: 0927  OT Stop Time: 0945  OT Total Time (min): 18 min    Billable Minutes:Evaluation 18  Total Time 18    SAUNDRA Prado  5/8/2019

## 2019-05-08 NOTE — ED PROVIDER NOTES
Encounter Date: 5/7/2019    SCRIBE #1 NOTE: I, Nkechi Castellon, am scribing for, and in the presence of,  Dr. Fischer. I have scribed the entire note.       History     Chief Complaint   Patient presents with    Abdominal Pain     Patient presents to the ED via [facility] with reports of having abdominal pain with nausea, vomiting, and fever. EMS reports patient was partying last night with alcohol and cocaine use.      This is a 34 y.o. male with no known PMHx who presents to the Emergency Department via EMS due to abdominal pain with nausea, vomiting, and diarrhea with an onset of this morning. Patient describes his abdominal pain as a shooting stabbing pain located to RUQ, RLQ, and LLQ. He reports of multiple episodes of diarrhea and emesis. He denies any hematemesis or blood in stool. Symptoms are associated with mild discomfort to his chest and fever. He denies any SOB or numbness to his extremities. Patient reports he was out partying last night and endorses EtOH, cocaine, and marijuana use last night; last cocaine use at 2 am this morning. EMS reports administering Zofran and fluid bolus while en route.     The history is provided by the patient.     Review of patient's allergies indicates:  No Known Allergies  No past medical history on file.  No past surgical history on file.  No family history on file.  Social History     Tobacco Use    Smoking status: Former Smoker     Packs/day: 0.50   Substance Use Topics    Alcohol use: No    Drug use: No     Review of Systems   Constitutional: Negative for chills and fever.   HENT: Negative for rhinorrhea, sore throat and trouble swallowing.    Eyes: Negative for visual disturbance.   Respiratory: Negative for cough and shortness of breath.    Cardiovascular: Positive for chest pain.   Gastrointestinal: Positive for abdominal pain, diarrhea, nausea and vomiting. Negative for blood in stool.   Genitourinary: Negative for dysuria and hematuria.   Musculoskeletal:  Negative for back pain.   Skin: Negative for rash.   Neurological: Negative for numbness and headaches.   Hematological: Negative for adenopathy.       Physical Exam     Initial Vitals [05/07/19 1928]   BP Pulse Resp Temp SpO2   (!) 158/78 78 18 (!) 101 °F (38.3 °C) 99 %      MAP       --         Physical Exam    Nursing note and vitals reviewed.  Constitutional: He appears well-developed and well-nourished. He is not diaphoretic. No distress.   HENT:   Head: Normocephalic and atraumatic.   Mouth/Throat: Oropharynx is clear and moist.   Eyes: EOM are normal. Pupils are equal, round, and reactive to light.   Neck: No tracheal deviation present.   Cardiovascular: Normal rate, regular rhythm, normal heart sounds and intact distal pulses.   Pulmonary/Chest: Breath sounds normal. No stridor. No respiratory distress.   Abdominal: Soft. He exhibits no distension and no mass. There is tenderness (generalized).   Musculoskeletal: Normal range of motion. He exhibits no edema.   Neurological: He is alert and oriented to person, place, and time. No cranial nerve deficit or sensory deficit.   Skin: Skin is warm and dry. Capillary refill takes less than 2 seconds. No rash noted.   Psychiatric: He has a normal mood and affect. His behavior is normal. Thought content normal.         ED Course   Procedures  Labs Reviewed   CULTURE, BLOOD   CULTURE, BLOOD   LACTIC ACID, PLASMA   DRUG SCREEN PANEL, URINE EMERGENCY   URINALYSIS, REFLEX TO URINE CULTURE   COMPREHENSIVE METABOLIC PANEL   CBC W/ AUTO DIFFERENTIAL   TROPONIN I          Imaging Results    None          Medical Decision Making:   Clinical Tests:   Lab Tests: Ordered and Reviewed  Radiological Study: Ordered and Reviewed  Medical Tests: Ordered and Reviewed  ED Management:  At time of shift change, patient's ED workup incomplete. Oncoming ED physician to continue care. All relevant details were discussed, including pending workup and planned disposition. See summary  below.    HPI: 33 yo male with n/v/d body aches after drinking and cocaine use. He arrivals febrile  Workup: labs ua uds ct scan  Pending: UA/ UDS/ CT scan  Disposition: anticipate discharge, pending results of CT scan and PO tolerance.       Update at 11:18 p.m. assumed care from Dr. Fischer at 10:15 p.m. CT scan results demonstrate pancolitis patient continues to be febrile to [place] [address] continues to complain of abdominal pain I feel he should be admitted for IV antibiotic stool cultures have been ordered this seems to be more of an infectious process.  The [facility] residents have been advised and will see the patient admit him.  There is no evidence of perforation appendicitis or pancreatitis patient's lactic acid is normal at this time as is his white blood cell count but as noted he has continued to complain of pain and be febrile.                     ED Course as of May 07 2224   Tue May 07, 2019   2126 EKG: Rate 86 NSR NO  ST changes.  Twave inversion lead 3    [RN]      ED Course User Index  [RN] Miles Fischer Jr., MD     Clinical Impression:   No diagnosis found.       I, Dr. Moreno Cavanaugh, personally performed the services described in this documentation.   All medical record entries made by the scribe were at my direction and in my presence.   I have reviewed the chart and agree that the record is accurate and complete.   Moreno Cavanaugh MD.  11:18 PM 05/07/2019                    Moreno Cavanaugh MD  05/07/19 9593

## 2019-05-08 NOTE — H&P
History & Physical  [facility]      SUBJECTIVE:     History of Present Illness:  Mr. Mario Heller is a 33 y/o M w/ no know pmhx who presents to [facility] ED via [facility] w/ c/o diffuse colicky abd pain, increased urgency, tenesmus, and occasional constipation. He endorses he has ~2 BM/day. At times nancy blood is noted on both stool and toilet paper. He denies h/o internal or external hemorrhoids, no FM IBD, no first degree relative w/ Colon CA. On 5/7 morning, he endorsed episodes of diarrhea w/ associated emesis. Furthermore, he describes symptoms of gastritis. He noted that the previous night he was drinking/parting. Although he denies coffee ground emesis or hematochezia.  Denies C/CP/SOB/HA/or new rash. He endorses that he does not take any prescription medication.     No medications prior to admission.       Review of patient's allergies indicates:  No Known Allergies    History reviewed. No pertinent past medical history.  History reviewed. No pertinent surgical history.  History reviewed. No pertinent family history.  Social History     Tobacco Use    Smoking status: Current Every Day Smoker     Packs/day: 1.00     Types: Cigarettes   Substance Use Topics    Alcohol use: Yes     Comment: Weekend drinker equals one gallon of liquor    Drug use: Yes     Types: Cocaine, Marijuana     Comment: past week         Review of Systems:  Constitutional: Negative for chills and fever.   HENT: Negative for rhinorrhea, sore throat and trouble swallowing.    Eyes: Negative for visual disturbance.   Respiratory: Negative for cough and shortness of breath.    Cardiovascular: Negative for chest pain.   Gastrointestinal: Positive for epigastric abdominal pain, diarrhea, nausea and vomiting.    Genitourinary: Negative for dysuria and hematuria.   Musculoskeletal: Negative for back pain.   Skin: Negative for rash.   Neurological: Negative for numbness and headaches.   Hematological: Negative for  adenopathy    OBJECTIVE:     Vital Signs (Most Recent)  Temp: (!) 100.9 °F (38.3 °C) (05/08/19 0206)  Pulse: 60 (05/08/19 0400)  Resp: 18 (05/08/19 0206)  BP: 126/71 (05/08/19 0206)  SpO2: 99 % (05/08/19 0046)    Physical Exam:  Constitutional:  No distress.   Head: Normocephalic and atraumatic.   Mouth/Throat: Oropharynx is dry. Appears dehydrated.  Eyes: EOMI are normal. Pupils are equal, round, and reactive to light.   Neck: No tracheal deviation present.   Cardiovascular: Normal rate, regular rhythm, normal heart sounds and intact distal pulses.   Pulmonary/Chest: Breath sounds normal. No stridor. No respiratory distress.   Abdominal: Soft. He exhibits no distension and no mass. There is tenderness most noticeable in the epigastric region. No radiation. No peritoneal signs. No evidence of an acute abdomen.   Musculoskeletal: He exhibits no edema. TTP L knee joint line.  Neurological: No focal neurological deficit  Skin: Capillary refill takes less than 2 seconds. No rash noted.   Psychiatric: Mood and affect were congruent    Laboratory  CBC:   Recent Labs   Lab 05/07/19 2020   WBC 6.10   RBC 4.77   HGB 15.3   HCT 45.1   *   MCV 95   MCH 32.1*   MCHC 33.9     CMP:   Recent Labs   Lab 05/07/19 2020   GLU 94   CALCIUM 9.5   ALBUMIN 4.2   PROT 7.3      K 4.5   CO2 25      BUN 13   CREATININE 1.2   ALKPHOS 67   ALT 17   AST 23   BILITOT 0.7     LFTs:   Recent Labs   Lab 05/07/19 2020   ALT 17   AST 23   ALKPHOS 67   BILITOT 0.7   PROT 7.3   ALBUMIN 4.2     Coagulation: No results for input(s): LABPROT, INR, APTT in the last 168 hours.  Cardiac markers:   Recent Labs   Lab 05/07/19 2020   TROPONINI <0.006     ABGs: No results for input(s): PH, PCO2, PO2, HCO3, POCSATURATED, BE in the last 168 hours.    LABS  CBC  Recent Labs   Lab 05/07/19 2020   WBC 6.10   RBC 4.77   HGB 15.3   HCT 45.1   *   MCV 95   MCH 32.1*   MCHC 33.9     BMP  Recent Labs   Lab 05/07/19 2020      K 4.5    CO2 25      BUN 13   CREATININE 1.2     POCT-Glucose  No results found for: POCTGLUCOSE    Recent Labs   Lab 05/07/19 2020   CALCIUM 9.5     LFT  Recent Labs   Lab 05/07/19 2020   PROT 7.3   ALBUMIN 4.2   BILITOT 0.7   AST 23   ALKPHOS 67   ALT 17     COAGS  No results for input(s): PT, INR, APTT in the last 168 hours.  CE  Recent Labs   Lab 05/07/19 2020   TROPONINI <0.006     ABGs  No results for input(s): PH, PCO2, PO2, HCO3, POCSATURATED, BE in the last 24 hours.  BNP  No results for input(s): BNP in the last 168 hours.  UA  Recent Labs   Lab 05/08/19  0109   COLORU Yellow   SPECGRAV <=1.005*   PHUR 6.0   PROTEINUA Negative     LAST HbA1c  No results found for: HGBA1C      Diagnostic Results:  CT Abdomen Pelvis With Contrast  Narrative: EXAMINATION:  CT ABDOMEN PELVIS WITH CONTRAST    CLINICAL HISTORY:  Infection, abdomen-pelvis;    TECHNIQUE:  Low dose axial images, sagittal and coronal reformations were obtained from the lung bases to the pubic symphysis following the IV administration of 100 mL of Omnipaque 350 .  Oral contrast was not administered.    COMPARISON:  None.    FINDINGS:  Abdomen:    - Lower thorax:Unremarkable.    - Lung bases: No infiltrates and no nodules.    - Liver: No focal mass.    - Gallbladder: No calcified gallstones.    - Bile Ducts: No evidence of intra or extra hepatic biliary ductal dilation.    - Spleen: Negative.    - Kidneys: No mass or hydronephrosis.    - Adrenals: Unremarkable.    - Pancreas: No mass or peripancreatic fat stranding.    - Retroperitoneum:  No significant adenopathy.    - Vascular: No abdominal aortic aneurysm.    - Abdominal wall:  Unremarkable.    Pelvis:    No pelvic mass, adenopathy, or free fluid.    Bowel/Mesentery:    No bowel obstruction.  Diffuse colonic wall thickening.  Normal appendix.    Bones:  No acute osseous abnormality and no suspicious lytic or blastic lesion.  Impression: Diffuse colonic wall thickening suggesting  colitis.    Electronically signed by: Nader Alvarado MD  Date:    05/07/2019  Time:    22:24    Imaging Results          CT Abdomen Pelvis With Contrast (Final result)  Result time 05/07/19 22:24:16    Final result by Nader Alvarado MD (05/07/19 22:24:16)                 Impression:      Diffuse colonic wall thickening suggesting colitis.      Electronically signed by: Nader Alvarado MD  Date:    05/07/2019  Time:    22:24             Narrative:    EXAMINATION:  CT ABDOMEN PELVIS WITH CONTRAST    CLINICAL HISTORY:  Infection, abdomen-pelvis;    TECHNIQUE:  Low dose axial images, sagittal and coronal reformations were obtained from the lung bases to the pubic symphysis following the IV administration of 100 mL of Omnipaque 350 .  Oral contrast was not administered.    COMPARISON:  None.    FINDINGS:  Abdomen:    - Lower thorax:Unremarkable.    - Lung bases: No infiltrates and no nodules.    - Liver: No focal mass.    - Gallbladder: No calcified gallstones.    - Bile Ducts: No evidence of intra or extra hepatic biliary ductal dilation.    - Spleen: Negative.    - Kidneys: No mass or hydronephrosis.    - Adrenals: Unremarkable.    - Pancreas: No mass or peripancreatic fat stranding.    - Retroperitoneum:  No significant adenopathy.    - Vascular: No abdominal aortic aneurysm.    - Abdominal wall:  Unremarkable.    Pelvis:    No pelvic mass, adenopathy, or free fluid.    Bowel/Mesentery:    No bowel obstruction.  Diffuse colonic wall thickening.  Normal appendix.    Bones:  No acute osseous abnormality and no suspicious lytic or blastic lesion.                                ASSESSMENT/PLAN:   Mr. Mario Heller is a 35 y/o M w/ pmhx who of tobacco use disorder who presents to [facility] ED via [facility] w/ c/o diffuse colicky abd pain, increased urgency, tenesmus, and occasional constipation. In the ED, U-tox positive for both Cocaine and THC.    Acute Colitis  - Ordered C-diff and follow-up on results  - Follow-up  on lab results  - Follow-up on Stool cultures  - Continue to advance diet as tolerated  - Recommend both tobacco and ETOH cessation  - Continue Metronidazole and Cipro  - Continue supportive care and close monitoring    JOSE RAMON Present on Admission  - Cr baseline 0.9  - Cr 1.2 upon admission  - Continue mIVF's until tolerating po well  - Continue to monitor  - Avoid nephrotoxic drugs  - Strict I/O's    Tobacco use disorder  - Smoking cessation counseling provided  - Continue to monitor    Thrombocytopenia  - Plts: 113  - May be secondary to ETOH use  - The patient is not on Heparin  - Continue to monitor    DVT ppx: SCD's, RAOUL  GI ppx: Protonix  Diet: Advance as tolerated  Code: Full     Case discussed with Dr. Bao Ho Jr., D.O.  [facility], -1  [facility]   [phone]

## 2019-05-08 NOTE — PLAN OF CARE
Problem: Physical Therapy Goal  Goal: Physical Therapy Goal  Outcome: Outcome(s) achieved Date Met: 05/08/19  PT evaluation completed, note to follow. Pt functioning at OF with no further IP PT needs, d/c PT.

## 2019-05-08 NOTE — PT/OT/SLP EVAL
Physical Therapy Evaluation and Discharge Note    Patient Name:  Mario Heller   MRN:  [id number]    Recommendations:     Discharge Recommendations:  home   Discharge Equipment Recommendations: none   Barriers to discharge: None    Assessment:     Mario Heller is a 34 y.o. male admitted with a medical diagnosis of Acute colitis. .  At this time, patient is functioning at their prior level of function and does not require further acute PT services.     Recent Surgery: * No surgery found *      Plan:     During this hospitalization, patient does not require further acute PT services.  Please re-consult if situation changes.      Subjective     Chief Complaint: none  Patient/Family Comments/goals: pt reporting he is no longer having spasms in abdomen and back at this time  Pain/Comfort:  · Pain Rating 1: 0/10  · Pain Rating Post-Intervention 1: 0/10    Patients cultural, spiritual, Sikhism conflicts given the current situation: no    Living Environment:  Pt lives either alone or at his [place] aunt's house. He lives in a 1st floor [facility] with 4 ILIANA with no rails, maria l's aunt lives in a [facility] with ramp entrance.  Prior to admission, patients level of function was independent without AD for mobility and ADLs, drives, and works as a .  Equipment used at home: none.  DME owned (not currently used): none.  Upon discharge, patient will have assistance from family.    Objective:     Communicated with LYNETTE Brady prior to session.  Patient found right sidelying with bed alarm upon PT entry to room.    General Precautions: Standard, fall   Orthopedic Precautions:contact  Braces: N/A     Exams:  · Postural Exam:  Patient presented with the following abnormalities:    · -       neck flexed  · Sensation:    · -       Intact  · Skin Integrity/Edema:      · -       Skin integrity: Visible skin intact  · RLE ROM: WFL  · RLE Strength: WFL  · LLE ROM: WFL  · LLE Strength: WFL    Functional Mobility:  · Bed Mobility:      · Scooting: modified independence  · Supine to Sit: modified independence  · Sit to Supine: modified independence  · Transfers:     · Sit to Stand:  independence with no AD  · Gait: 240 ft with no AD and supervision initially then progressed to mod I  - ambulates with lateral lean and initially with instability and reporting this is his basleine    AM-PAC 6 CLICK MOBILITY  Total Score:24       Therapeutic Activities and Exercises:  Pt reporting he is at baseline with no further PT needs    AM-PAC 6 CLICK MOBILITY  Total Score:24     Patient left HOB elevated with all lines intact, call button in reach, bed alarm on and RN notified.    GOALS:   Multidisciplinary Problems     Physical Therapy Goals     Not on file          Multidisciplinary Problems (Resolved)        Problem: Physical Therapy Goal    Goal Priority Disciplines Outcome Goal Variances Interventions   Physical Therapy Goal   (Resolved)     PT, PT/OT Outcome(s) achieved                     History:     History reviewed. No pertinent past medical history.    History reviewed. No pertinent surgical history.    Time Tracking:     PT Received On: 05/08/19  PT Start Time: 1033     PT Stop Time: 1044  PT Total Time (min): 11 min     Billable Minutes: Evaluation 11      Krysten Heller, PT  05/08/2019

## 2019-05-08 NOTE — PLAN OF CARE
Problem: Occupational Therapy Goal  Goal: Occupational Therapy Goal  Outcome: Outcome(s) achieved Date Met: 05/08/19  Pt found in R sidelying & agreeable to OT eval this AM. Pt lives w/ a friend in 1st fl apt w/ 3-4STE & 0HR; t/s combo. PLOF: (I) w/o DME w/ all fxnl tasks incl standing showers, IADLs & driving. Currently, pt c/o 8-9/10 abd & low back pain & perf all eval tasks w/ MI. Edu/tx re: general safety techs, deep/PLBing techs. Pt verbalized understanding. Pt returned to supine w/ bed alarm & nsg notified.    Pt w/o signif change in fxnl status from baseline (I). No further OT svcs indicated at this time.

## 2019-05-08 NOTE — PT/OT/SLP EVAL
"Speech Language Pathology Evaluation  Bedside Swallow  Discharge    Patient Name:  Mario Heller   MRN:  [id number]  Admitting Diagnosis: Acute colitis    Recommendations:                 General Recommendations:  Follow-up not indicated  Diet recommendations:  Regular, Thin   Aspiration Precautions: follow standard swallow precautions   General Precautions: Standard, fall(standard precautions)  Communication strategies:  none    History:   History of Present Illness:  Mr. Mario Heller is a 35 y/o M w/ no know pmhx who presents to [facility] ED via [facility] w/ c/o diffuse colicky abd pain, increased urgency, tenesmus, and occasional constipation. He endorses he has ~2 BM/day. At times nancy blood is noted on both stool and toilet paper. He denies h/o internal or external hemorrhoids, no FM IBD, no first degree relative w/ Colon CA. On 5/7 morning, he endorsed episodes of diarrhea w/ associated emesis. Furthermore, he describes symptoms of gastritis. He noted that the previous night he was drinking/parting. Although he denies coffee ground emesis or hematochezia.  Denies C/CP/SOB/HA/or new rash. He endorses that he does not take any prescription medication.  Pt tested + for cocaine and THC     No medications prior to admission.     History reviewed. No pertinent past medical history.    History reviewed. No pertinent surgical history.    Social History: Patient lives at home, has GF present who walked out of room prior to  assessment.    Prior Intubation HX:  n/a    Modified Barium Swallow: no hx of dysphagia    Chest X-Rays: Lung bases: No infiltrates and no nodules.    Prior diet: reg/thin, no hx of dysphagia    Subjective     Consult received for clinical swallow eval this date, SLP did communicate with RN prior to eval/treat.    Patient goals: "Oh I they gave me nothing to eat."    Pain/Comfort:  · Pain Rating 1: 8/10  · Location 1: abdomen(reports shooting pain in abdomen. RN made aware, Dr. Henson made aware and " "already ordered medication)    Objective:   Pt seen at bedside for clinical swallow.   Pt reported abdominal pain, RN aware of pain level 8/10 and MD made aware as well.  Pt did agree to PO trials with SLP.  Pt was alert and oriented.  Engaged in brief discourse with SLP.   Pt currently with mask on face reports he has some "on and off" nausea and sharp abd pains.    Oral Musculature Evaluation  · Oral Musculature: WFL  · Dentition: present and adequate  · Mucosal Quality: good, adequate  · Mandibular Strength and Mobility: WFL  · Oral Labial Strength and Mobility: WFL  · Lingual Strength and Mobility: WFL  · Velar Elevation: WFL  · Buccal Strength and Mobility: WFL  · Volitional Cough: elicited  · Volitional Swallow: timely swallow  · Voice Prior to PO Intake: clear voice  · Oral Musculature Comments: no facial weakness    Bedside Swallow Eval:   Consistencies Assessed:  · Thin liquids water self fed by cup/straw  · Puree pudding self fed x1 bite  · Solids cracker 1/4 bite     Oral Phase:   · WFL   · Fair labial seal  · No drooling or bolus holding present    Pharyngeal Phase:   · no overt clinical signs/symptoms of aspiration  · no overt clinical signs/symptoms of pharyngeal dysphagia   · Timely swallow palpated  · No wet voice present    Treatment: No further acute speech pathology services warranted at this time. Please re-consult should patient experience a change in status.        Assessment:     Mario Heller is a 34 y.o. male admitted with Acute colitis who presents with functional oral and pharyngeal phase of the swallow. No audible s/s of dysphagia present.   Diet recommendations and goals discussed with Primary MD, Dr. Ho and patient.   No further acute speech pathology services warranted at this time. Please re-consult should patient experience a change in status.          Goals:   Multidisciplinary Problems     SLP Goals     Not on file                Plan:     · Patient to be seen:      · Plan of " Care expires:     · Plan of Care reviewed with:  patient   · SLP Follow-Up:  No       Discharge recommendations:  (home)       Time Tracking:     SLP Treatment Date:   05/08/19  Speech Start Time:  1115  Speech Stop Time:  1130     Speech Total Time (min):  15 min    Billable Minutes: Eval Swallow and Oral Function 15    ISI So, CCC-SLP  05/08/2019

## 2019-05-08 NOTE — ED NOTES
Instructed patient on collection of stool specimen for C-Diff. Hat and specimen cup provided. Patient placed on contact isolation precautions. Patient also aware of need to collect urine specimen.

## 2019-05-08 NOTE — PLAN OF CARE
VN note: VN cued into patient's room for introduction. VN informed patient that VN would be working closely along side bedside nurse, PCT, and the rest of care team and making rounds throughout the shift. Fall risk education provided. Allowed time for questions. Krysten with PT at bedside waiting to work with patient. VN will continue to be available to patient and intervene prn.       05/08/19 1035   Type of Frequent Check   Type Patient Rounds;Other (see comments)  (VN Rounds)   Safety/Activity   Patient Rounds bed in low position;visualized patient   Safety Promotion/Fall Prevention instructed to call staff for mobility;side rails raised x 2   Activity Management activity adjusted per tolerance   Positioning   Body Position positioned/repositioned independently;side-lying, right   Head of Bed (HOB) HOB at 20-30 degrees   Assessments (Pre/Post)   Level of Consciousness (AVPU) alert

## 2019-05-08 NOTE — PLAN OF CARE
Problem: Adult Inpatient Plan of Care  Goal: Plan of Care Review  Outcome: Ongoing (interventions implemented as appropriate)     05/08/19 0881   Plan of Care Review   Plan of Care Reviewed With patient   AAO,pt instructed on special contact isolation and hygiene measures,stated understanding using teachback method,reminded  about stool specimen with supplies in bathroom,ivf in progress,see flowsheet,assessment ongoing,safety and comfort measures maintained.

## 2019-05-09 ENCOUNTER — DOCUMENTATION ONLY (OUTPATIENT)
Dept: PHARMACY | Facility: CLINIC | Age: 34
End: 2019-05-09

## 2019-05-09 VITALS
TEMPERATURE: 100 F | OXYGEN SATURATION: 98 % | RESPIRATION RATE: 18 BRPM | WEIGHT: 234.81 LBS | BODY MASS INDEX: 32.87 KG/M2 | HEART RATE: 51 BPM | DIASTOLIC BLOOD PRESSURE: 78 MMHG | HEIGHT: 71 IN | SYSTOLIC BLOOD PRESSURE: 139 MMHG

## 2019-05-09 LAB
ALBUMIN SERPL BCP-MCNC: 3.2 G/DL (ref 3.5–5.2)
ALP SERPL-CCNC: 46 U/L (ref 55–135)
ALT SERPL W/O P-5'-P-CCNC: 11 U/L (ref 10–44)
ANION GAP SERPL CALC-SCNC: 8 MMOL/L (ref 8–16)
AST SERPL-CCNC: 18 U/L (ref 10–40)
BASOPHILS # BLD AUTO: 0.01 K/UL (ref 0–0.2)
BASOPHILS NFR BLD: 0.3 % (ref 0–1.9)
BILIRUB SERPL-MCNC: 0.3 MG/DL (ref 0.1–1)
BUN SERPL-MCNC: 9 MG/DL (ref 6–20)
CALCIUM SERPL-MCNC: 8.8 MG/DL (ref 8.7–10.5)
CHLORIDE SERPL-SCNC: 107 MMOL/L (ref 95–110)
CO2 SERPL-SCNC: 23 MMOL/L (ref 23–29)
CREAT SERPL-MCNC: 1 MG/DL (ref 0.5–1.4)
DIFFERENTIAL METHOD: ABNORMAL
E COLI SXT1 STL QL IA: NEGATIVE
E COLI SXT2 STL QL IA: NEGATIVE
EOSINOPHIL # BLD AUTO: 0.1 K/UL (ref 0–0.5)
EOSINOPHIL NFR BLD: 1.6 % (ref 0–8)
ERYTHROCYTE [DISTWIDTH] IN BLOOD BY AUTOMATED COUNT: 13.6 % (ref 11.5–14.5)
EST. GFR  (AFRICAN AMERICAN): >60 ML/MIN/1.73 M^2
EST. GFR  (NON AFRICAN AMERICAN): >60 ML/MIN/1.73 M^2
GLUCOSE SERPL-MCNC: 88 MG/DL (ref 70–110)
HCT VFR BLD AUTO: 38.3 % (ref 40–54)
HGB BLD-MCNC: 13 G/DL (ref 14–18)
LYMPHOCYTES # BLD AUTO: 0.8 K/UL (ref 1–4.8)
LYMPHOCYTES NFR BLD: 25.3 % (ref 18–48)
MAGNESIUM SERPL-MCNC: 1.8 MG/DL (ref 1.6–2.6)
MCH RBC QN AUTO: 31.7 PG (ref 27–31)
MCHC RBC AUTO-ENTMCNC: 33.9 G/DL (ref 32–36)
MCV RBC AUTO: 93 FL (ref 82–98)
MONOCYTES # BLD AUTO: 0.3 K/UL (ref 0.3–1)
MONOCYTES NFR BLD: 9.8 % (ref 4–15)
NEUTROPHILS # BLD AUTO: 2 K/UL (ref 1.8–7.7)
NEUTROPHILS NFR BLD: 63 % (ref 38–73)
O+P STL TRI STN: NORMAL
PHOSPHATE SERPL-MCNC: 2.6 MG/DL (ref 2.7–4.5)
PLATELET # BLD AUTO: 103 K/UL (ref 150–350)
PMV BLD AUTO: 13.6 FL (ref 9.2–12.9)
POTASSIUM SERPL-SCNC: 3.7 MMOL/L (ref 3.5–5.1)
PROT SERPL-MCNC: 6 G/DL (ref 6–8.4)
RBC # BLD AUTO: 4.1 M/UL (ref 4.6–6.2)
SODIUM SERPL-SCNC: 138 MMOL/L (ref 136–145)
WBC # BLD AUTO: 3.16 K/UL (ref 3.9–12.7)

## 2019-05-09 PROCEDURE — S0030 INJECTION, METRONIDAZOLE: HCPCS | Performed by: STUDENT IN AN ORGANIZED HEALTH CARE EDUCATION/TRAINING PROGRAM

## 2019-05-09 PROCEDURE — 94761 N-INVAS EAR/PLS OXIMETRY MLT: CPT

## 2019-05-09 PROCEDURE — 85025 COMPLETE CBC W/AUTO DIFF WBC: CPT

## 2019-05-09 PROCEDURE — 80053 COMPREHEN METABOLIC PANEL: CPT

## 2019-05-09 PROCEDURE — 36415 COLL VENOUS BLD VENIPUNCTURE: CPT

## 2019-05-09 PROCEDURE — 25000003 PHARM REV CODE 250: Performed by: STUDENT IN AN ORGANIZED HEALTH CARE EDUCATION/TRAINING PROGRAM

## 2019-05-09 PROCEDURE — 63600175 PHARM REV CODE 636 W HCPCS: Performed by: STUDENT IN AN ORGANIZED HEALTH CARE EDUCATION/TRAINING PROGRAM

## 2019-05-09 PROCEDURE — 83735 ASSAY OF MAGNESIUM: CPT

## 2019-05-09 PROCEDURE — 84100 ASSAY OF PHOSPHORUS: CPT

## 2019-05-09 RX ORDER — SIMETHICONE 125 MG
125 TABLET,CHEWABLE ORAL ONCE
Status: COMPLETED | OUTPATIENT
Start: 2019-05-09 | End: 2019-05-09

## 2019-05-09 RX ORDER — ONDANSETRON 4 MG/1
8 TABLET, FILM COATED ORAL EVERY 6 HOURS PRN
Qty: 30 TABLET | Refills: 0 | Status: SHIPPED | OUTPATIENT
Start: 2019-05-09

## 2019-05-09 RX ORDER — DICYCLOMINE HYDROCHLORIDE 20 MG/1
20 TABLET ORAL 4 TIMES DAILY PRN
Qty: 30 TABLET | Refills: 0 | Status: SHIPPED | OUTPATIENT
Start: 2019-05-09 | End: 2019-06-08

## 2019-05-09 RX ORDER — METRONIDAZOLE 500 MG/1
500 TABLET ORAL EVERY 8 HOURS
Qty: 30 TABLET | Refills: 0 | Status: SHIPPED | OUTPATIENT
Start: 2019-05-09

## 2019-05-09 RX ORDER — CIPROFLOXACIN 500 MG/1
500 TABLET ORAL EVERY 12 HOURS
Qty: 20 TABLET | Refills: 0 | Status: SHIPPED | OUTPATIENT
Start: 2019-05-09

## 2019-05-09 RX ADMIN — DICYCLOMINE HYDROCHLORIDE 20 MG: 20 TABLET ORAL at 11:05

## 2019-05-09 RX ADMIN — CIPROFLOXACIN 400 MG: 2 INJECTION, SOLUTION INTRAVENOUS at 02:05

## 2019-05-09 RX ADMIN — SIMETHICONE 125 MG: 125 TABLET, CHEWABLE ORAL at 10:05

## 2019-05-09 RX ADMIN — DICYCLOMINE HYDROCHLORIDE 20 MG: 20 TABLET ORAL at 05:05

## 2019-05-09 RX ADMIN — LACTOBACILLUS TAB 1 TABLET: TAB at 10:05

## 2019-05-09 RX ADMIN — METRONIDAZOLE 500 MG: 500 INJECTION, SOLUTION INTRAVENOUS at 06:05

## 2019-05-09 NOTE — PLAN OF CARE
Cued into patient's room.  Permission received per patient to turn camera to view patient.  Introduced as VN for night shift that will be working with floor nurse and nursing assistant.  Alfonso JAMA at bedside.  Educated patient on VN's role in patient care. Plan of care reviewed with patient. Education per flowsheet.  Opportunity given for questions and questions answered.  Instructed to call for assistance.  Will cont to monitor.

## 2019-05-09 NOTE — PROGRESS NOTES
[facility]  Progress Note    SUBJECTIVE:     Follow up for: Colitis    Patient seen and examined this am. Tolerating a diet. Decrease in abd pain. No further episodes of vomiting.     Review of Systems:  Constitutional: Negative for chills and fever.   HENT: Negative for rhinorrhea, sore throat and trouble swallowing.    Eyes: Negative for visual disturbance.   Respiratory: Negative for cough and shortness of breath.    Cardiovascular: Negative for chest pain.   Gastrointestinal: Positive for epigastric abdominal pain, diarrhea, nausea and vomiting.    Genitourinary: Negative for dysuria and hematuria.   Musculoskeletal: Negative for back pain.   Skin: Negative for rash.   Neurological: Negative for numbness and headaches.   Hematological: Negative for adenopathy    OBJECTIVE:     Vital Signs (Most Recent)  Temp: 98.9 °F (37.2 °C) (05/09/19 0347)  Pulse: (!) 49 (05/09/19 0406)  Resp: 18 (05/09/19 0347)  BP: (!) 144/84 (05/09/19 0347)  SpO2: 96 % (05/09/19 0442)    Physical Exam:  Constitutional:  No distress.   Head: Normocephalic and atraumatic.   Mouth/Throat: Oropharynx is dry. Appears dehydrated.  Eyes: EOMI are normal. Pupils are equal, round, and reactive to light.   Neck: No tracheal deviation present.   Cardiovascular: Normal rate, regular rhythm, normal heart sounds and intact distal pulses.   Pulmonary/Chest: Breath sounds normal. No stridor. No respiratory distress.   Abdominal: Soft. He exhibits no distension and no mass. There is tenderness most noticeable in the epigastric region. No radiation. No peritoneal signs. No evidence of an acute abdomen.   Musculoskeletal: He exhibits no edema.   Neurological: No focal neurological deficit  Skin: Capillary refill takes less than 2 seconds. No rash noted.   Psychiatric: Mood and affect were congruent    Laboratory  CBC:   Recent Labs   Lab 05/09/19  0612   WBC 3.16*   RBC 4.10*   HGB 13.0*   HCT 38.3*   *   MCV 93   MCH 31.7*   MCHC 33.9     CMP:    Recent Labs   Lab 05/09/19  0612   GLU 88   CALCIUM 8.8   ALBUMIN 3.2*   PROT 6.0      K 3.7   CO2 23      BUN 9   CREATININE 1.0   ALKPHOS 46*   ALT 11   AST 18   BILITOT 0.3     LFTs:   Recent Labs   Lab 05/09/19  0612   ALT 11   AST 18   ALKPHOS 46*   BILITOT 0.3   PROT 6.0   ALBUMIN 3.2*     Coagulation:   Recent Labs   Lab 05/08/19 0551   LABPROT 13.3*   INR 1.3*   APTT 36.6*     Cardiac markers:   Recent Labs   Lab 05/07/19 2020   TROPONINI <0.006     LABS  CBC  Recent Labs   Lab 05/07/19 2020 05/08/19 0551 05/09/19 0612   WBC 6.10 3.69* 3.16*   RBC 4.77 4.42* 4.10*   HGB 15.3 14.0 13.0*   HCT 45.1 41.8 38.3*   * 118* 103*   MCV 95 95 93   MCH 32.1* 31.7* 31.7*   MCHC 33.9 33.5 33.9     BMP  Recent Labs   Lab 05/07/19 2020 05/08/19 0551 05/09/19 0612    138 138   K 4.5 3.5 3.7   CO2 25 22* 23    107 107   BUN 13 10 9   CREATININE 1.2 1.1 1.0       Recent Labs   Lab 05/07/19 2020 05/08/19 0551 05/09/19 0612   CALCIUM 9.5 8.3* 8.8   MG  --  1.4* 1.8   PHOS  --  2.5* 2.6*     LFT  Recent Labs   Lab 05/07/19 2020 05/08/19 0551 05/09/19 0612   PROT 7.3 6.2 6.0   ALBUMIN 4.2 3.3* 3.2*   BILITOT 0.7 0.5 0.3   AST 23 19 18   ALKPHOS 67 54* 46*   ALT 17 13 11     COAGS  Recent Labs   Lab 05/08/19 0551   INR 1.3*   APTT 36.6*     CE  Recent Labs   Lab 05/07/19 2020   TROPONINI <0.006     LAST HbA1c  Lab Results   Component Value Date    HGBA1C 5.2 05/08/2019         Diagnostic Results:  CT Abdomen Pelvis With Contrast  Narrative: EXAMINATION:  CT ABDOMEN PELVIS WITH CONTRAST    CLINICAL HISTORY:  Infection, abdomen-pelvis;    TECHNIQUE:  Low dose axial images, sagittal and coronal reformations were obtained from the lung bases to the pubic symphysis following the IV administration of 100 mL of Omnipaque 350 .  Oral contrast was not administered.    COMPARISON:  None.    FINDINGS:  Abdomen:    - Lower thorax:Unremarkable.    - Lung bases: No infiltrates and no  nodules.    - Liver: No focal mass.    - Gallbladder: No calcified gallstones.    - Bile Ducts: No evidence of intra or extra hepatic biliary ductal dilation.    - Spleen: Negative.    - Kidneys: No mass or hydronephrosis.    - Adrenals: Unremarkable.    - Pancreas: No mass or peripancreatic fat stranding.    - Retroperitoneum:  No significant adenopathy.    - Vascular: No abdominal aortic aneurysm.    - Abdominal wall:  Unremarkable.    Pelvis:    No pelvic mass, adenopathy, or free fluid.    Bowel/Mesentery:    No bowel obstruction.  Diffuse colonic wall thickening.  Normal appendix.    Bones:  No acute osseous abnormality and no suspicious lytic or blastic lesion.  Impression: Diffuse colonic wall thickening suggesting colitis.    Electronically signed by: Nader Alvarado MD  Date:    05/07/2019  Time:    22:24    Imaging Results          CT Abdomen Pelvis With Contrast (Final result)  Result time 05/07/19 22:24:16    Final result by Nader Alvarado MD (05/07/19 22:24:16)                 Impression:      Diffuse colonic wall thickening suggesting colitis.      Electronically signed by: Nader Alvarado MD  Date:    05/07/2019  Time:    22:24             Narrative:    EXAMINATION:  CT ABDOMEN PELVIS WITH CONTRAST    CLINICAL HISTORY:  Infection, abdomen-pelvis;    TECHNIQUE:  Low dose axial images, sagittal and coronal reformations were obtained from the lung bases to the pubic symphysis following the IV administration of 100 mL of Omnipaque 350 .  Oral contrast was not administered.    COMPARISON:  None.    FINDINGS:  Abdomen:    - Lower thorax:Unremarkable.    - Lung bases: No infiltrates and no nodules.    - Liver: No focal mass.    - Gallbladder: No calcified gallstones.    - Bile Ducts: No evidence of intra or extra hepatic biliary ductal dilation.    - Spleen: Negative.    - Kidneys: No mass or hydronephrosis.    - Adrenals: Unremarkable.    - Pancreas: No mass or peripancreatic fat stranding.    -  Retroperitoneum:  No significant adenopathy.    - Vascular: No abdominal aortic aneurysm.    - Abdominal wall:  Unremarkable.    Pelvis:    No pelvic mass, adenopathy, or free fluid.    Bowel/Mesentery:    No bowel obstruction.  Diffuse colonic wall thickening.  Normal appendix.    Bones:  No acute osseous abnormality and no suspicious lytic or blastic lesion.                              ASSESSMENT/PLAN:   Mr. Mario Heller is a 35 y/o M w/ pmhx who of tobacco use disorder who presents to [facility] ED via [facility] w/ c/o diffuse colicky abd pain, increased urgency, tenesmus, and occasional constipation. In the ED, U-tox positive for both Cocaine and THC.    Acute Colitis  - Ordered C-diff- came back negative  - Continue to advance diet as tolerated  - Recommend both tobacco and ETOH cessation  - Continue Metronidazole and Cipro  - Continue supportive care and close monitoring    JOSE RAMON Present on Admission  - Cr baseline 0.9  - Cr 1.2 upon admission  - Continue mIVF's until tolerating po well  - Avoid nephrotoxic drugs  - Strict I/O's  - resolved    Tobacco use disorder  - Smoking cessation counseling provided  - Continue to monitor    Thrombocytopenia  - Plts: 113  - May be secondary to ETOH use  - The patient is not on Heparin  - Continue to monitor    DVT ppx: SCD's, RAOUL  GI ppx: Protonix  Diet: Advance as tolerated  Code: Full     Dispo: advancing diet as tolerating. Will discharge on PO abx.     Secure chat (Ctrl+Alt+5) with any questions      Washington Tobias MD  [employer]  PGY-2  05/09/2019  12:43 PM

## 2019-05-09 NOTE — PROGRESS NOTES
ArronMario #[id number] (CSN: [id number]) (34 y.o. M) (Adm: 05/07/19)   [facility] [id number] A   PCP     NO, PRIMARY DOCTOR   Date of Birth     1985   Demographics     Address: Home Phone: Work Phone: Mobile Phone:     [address] [place] [address] [phone]    SSN: Insurance: Marital Status: Mormonism:      MEDICAID Single Tenriism    Admission Dx      Drug abuse    Colitis    Generalized abdominal pain    Acute colitis    Vomiting and diarrhea    Chest pain    Fever in other diseases    Vomiting and diarrhea   Chief Complaint     Complaint Comment   Abdominal Pain Patient presents to the ED via [facility] with reports of having abdominal pain with nausea, vomiting, and fever. EMS reports patient was partying last night with alcohol and cocaine use.    Documents Filed to Patient     Power of  Living Will Clinical Unknown Study Attachment Consent Form ABN Waiver After Visit Summary Lab Result Scan Code Status Patient Portal Status   Not on File Not on File Not on File Not on File Not on File Not on File Filed Not on File FULL [Updated on 05/08/19 0137] Pending   Auth/Cert Information      Open Auth/Cert for Hospital Account [id number]      Admission Information     Attending Provider Admitting Provider Admission Type Admission Date/Time   Severyn Yaroshevsky, MD Severyn Yaroshevsky, MD Emergency 05/07/19 1929   Discharge Date Hospital Service Auth/Cert Status Service Area    Family Medicine [facility]   Unit Room/Bed Admission Status    [facility] MEDICAL SURGICAL UNIT ACUTE K514/K514 A Admission (Confirmed)    Hospital Account     Name Acct ID Class Status Primary Coverage   Mario Heller [id number] IP- Inpatient Open MEDICAID - PENDING MEDICAID          Guarantor Account (for Hospital Account #[id number])     Name Relation to Pt Service Area Active? Acct Type   Mario Heller Self OHSSA Yes Personal/Family   Address Phone     [address]  [place] [address] [phone](H)            Coverage Information (for Hospital Account #[id number])     F/O Payor/Plan Precert #   MEDICAID/PENDING MEDICAID    Subscriber Subscriber #   Arron Mario [id number]   Address Phone   [address] [address]  [place] [address] [phone]          Emergency Contact Information     Name: Edwin Heller Relationship: Brother   Address:     City:  State:  Zip:  Phone:     Business phone:

## 2019-05-09 NOTE — PLAN OF CARE
VN note: VN cued into patient's room. No family at bedside. Patient awaiting his fiance' for transport home. VN reviewed new medications with patient and side effects. I also notified MD team to call in the rest of patient's prescriptions to the [facility]. Patient aware and agreeable. Follow-up information reviewed. VN educated patient on colitis overview, signs and symptoms, and well as when to seek medical attention. Patient verbalized understanding and all questions answered. Refer to clinical references for further education. I informed patient still awaiting his prescriptions from the pharmacy before he can leave. He verbalized understanding.

## 2019-05-09 NOTE — PLAN OF CARE
Problem: Adult Inpatient Plan of Care  Goal: Plan of Care Review  Outcome: Ongoing (interventions implemented as appropriate)  Pt on RA with documented sats.98. Will continue to monitor.

## 2019-05-09 NOTE — DISCHARGE SUMMARY
[facility]  Discharge Summary      Admit Date: 5/7/2019    Discharge Date and Time: 5/9/2019  2:02 PM    Attending Physician: Severyn Yaroshevsky, MD     Reason for Admission: Acute gastroenetritis    Procedures Performed: * No surgery found *    Hospital Course 33 y/o M w/ no know pmhx who presented to [facility] ED w/ c/o diffuse colicky abd pain, increased urgency, tenesmus, and occasional constipation. He endorsed he has ~2 BM/day. At times nancy blood is noted on both stool and toilet paper. He denied h/o internal or external hemorrhoids, no Family History of IBD, no first degree relative w/ Colon CA. On 5/7 morning, he endorsed episodes of diarrhea w/ associated emesis. Furthermore, he describes symptoms of gastritis. He noted that the previous night he was drinking/parting. Although he denies coffee ground emesis or hematochezia.  Denied C/CP/SOB/HA or new rash. CT abd pelvis obtained revealed colitis. Patient admitted to hospital service for further workup as patient with intractable nausea and vomiting. C diff, stool cultures ordered. Patient started on supportive measures as well as cipro and flagyl. Pt was initially NPO and then his diet was switched to clear liquid diet, advance as tolerated. Pt also found to have an JOSE RAMON on admission as his Cr was 1.2 on admission, up from baseline of 0.9. Fluids were discontinued once patient was able to tolerate a diet and Cr returned to baseline. C diff returned negative. Abd pain gradually decreased throughout stay and bentyl was added to regimen. Patient was transitioned to oral abx prior to discharge. All questions answered and pt expressed understanding. Patient discharged in stable condition with appropriate follow ups scheduled.     Consults: none    Significant Diagnostic Studies:     Recent Labs   Lab 05/09/19  0612   GLU 88      K 3.7      CO2 23   BUN 9   CREATININE 1.0   CALCIUM 8.8   MG 1.8     Recent Labs   Lab 05/09/19  0612       K 3.7      CO2 23   GLU 88   BUN 9   CREATININE 1.0   CALCIUM 8.8   PROT 6.0   ALBUMIN 3.2*   BILITOT 0.3   ALKPHOS 46*   AST 18   ALT 11   ANIONGAP 8   ESTGFRAFRICA >60   EGFRNONAA >60     Recent Labs   Lab 05/09/19  0612   WBC 3.16*   HGB 13.0*   HCT 38.3*   *   , INR   Lab Results   Component Value Date    INR 1.3 (H) 05/08/2019     Recent Labs   Lab 05/07/19 2020   TROPONINI <0.006    and A1C:   Recent Labs   Lab 05/08/19  0551   HGBA1C 5.2     Blood Culture   Lab Results   Component Value Date    LABBLOO No Growth to date 05/07/2019    LABBLOO No Growth to date 05/07/2019    LABBLOO No Growth to date 05/07/2019     Results for orders placed or performed during the hospital encounter of 05/07/19   CT Abdomen Pelvis With Contrast    Narrative    EXAMINATION:  CT ABDOMEN PELVIS WITH CONTRAST    CLINICAL HISTORY:  Infection, abdomen-pelvis;    TECHNIQUE:  Low dose axial images, sagittal and coronal reformations were obtained from the lung bases to the pubic symphysis following the IV administration of 100 mL of Omnipaque 350 .  Oral contrast was not administered.    COMPARISON:  None.    FINDINGS:  Abdomen:    - Lower thorax:Unremarkable.    - Lung bases: No infiltrates and no nodules.    - Liver: No focal mass.    - Gallbladder: No calcified gallstones.    - Bile Ducts: No evidence of intra or extra hepatic biliary ductal dilation.    - Spleen: Negative.    - Kidneys: No mass or hydronephrosis.    - Adrenals: Unremarkable.    - Pancreas: No mass or peripancreatic fat stranding.    - Retroperitoneum:  No significant adenopathy.    - Vascular: No abdominal aortic aneurysm.    - Abdominal wall:  Unremarkable.    Pelvis:    No pelvic mass, adenopathy, or free fluid.    Bowel/Mesentery:    No bowel obstruction.  Diffuse colonic wall thickening.  Normal appendix.    Bones:  No acute osseous abnormality and no suspicious lytic or blastic lesion.      Impression    Diffuse colonic wall thickening  suggesting colitis.      Electronically signed by: Nader Alvarado MD  Date:    05/07/2019  Time:    22:24    and CT ABDOMEN PELVIS WITHOUT CONTRAST: No results found for this visit on 05/07/19.  Specimen (12h ago, onward)    None        Final Diagnoses:    Principal Problem: Acute colitis   Secondary Diagnoses:   Active Hospital Problems    Diagnosis  POA    *Acute colitis [K52.9]  Yes    JOSE RAMON (acute kidney injury) [N17.9]  Yes    Thrombocytopenia [D69.6]  Yes    Tobacco use disorder [F17.200]  Yes    Vomiting and diarrhea [R11.10, R19.7]  Yes      Resolved Hospital Problems   No resolved problems to display.       Discharged Condition: stable    Disposition: Home or Self Care    Follow Up/Patient Instructions:     Medications:  Reconciled Home Medications:      Medication List      START taking these medications    acetaminophen 500 MG tablet  Commonly known as:  TYLENOL  Take 1 tablet (500 mg total) by mouth every 6 (six) hours as needed for Pain.     ciprofloxacin HCl 500 MG tablet  Commonly known as:  CIPRO  Take 1 tablet (500 mg total) by mouth every 12 (twelve) hours.     dicyclomine 20 mg tablet  Commonly known as:  BENTYL  Take 1 tablet (20 mg total) by mouth 4 (four) times daily as needed (gi discomfort).     metroNIDAZOLE 500 MG tablet  Commonly known as:  FLAGYL  Take 1 tablet (500 mg total) by mouth every 8 (eight) hours.     ondansetron 4 MG tablet  Commonly known as:  ZOFRAN  Take 2 tablets (8 mg total) by mouth every 6 (six) hours as needed for Nausea.          Discharge Procedure Orders   Ambulatory referral to Gastroenterology   Referral Priority: Routine Referral Type: Consultation   Referral Reason: Specialty Services Required   Referred to Provider: [facility] Requested Specialty: Gastroenterology   Number of Visits Requested: 1     Ambulatory referral to Gastroenterology   Referral Priority: Routine Referral Type: Consultation   Referral Reason: Specialty  Services Required   Referred to Provider: [facility] Requested Specialty: Gastroenterology   Number of Visits Requested: 1     Follow-up Information     [facility].    Specialties:  Neurosurgery, Plastic Surgery, Podiatry, Surgical Oncology, Allergy, Cardiothoracic Surgery, Otolaryngology, Gastroenterology, Breast Surgery, Oral Surgery, Oral and Maxillofacial Surgery, Cardiology, Bariatrics, Internal Medicine, Family Medicine, Colon and Rectal Surgery, Dental General Practice, Gynecology, Orthopedic Surgery, Genetics, Endocrinology, Vascular Surgery, Physical Medicine and Rehabilitation, Urology, Neurology, Dermatology, Rheumatology, Occupational Therapy, Ophthalmology, Optometry, Radiology  Contact information:  [address] [facility] [address]  [phone]             Schedule an appointment as soon as possible for a visit with Primary Doctor No.           [facility] .    Specialties:  Neurosurgery, Plastic Surgery, Podiatry, Surgical Oncology, Allergy, Cardiothoracic Surgery, Otolaryngology, Gastroenterology, Breast Surgery, Oral Surgery, Oral and Maxillofacial Surgery, Cardiology, Bariatrics, Internal Medicine, Family Medicine, Colon and Rectal Surgery, Dental General Practice, Gynecology, Orthopedic Surgery, Genetics, Endocrinology, Vascular Surgery, Physical Medicine and Rehabilitation, Urology, Neurology, Dermatology, Rheumatology, Occupational Therapy, Ophthalmology, Optometry, Radiology  Contact information:  [address] [facility] [address]  [phone]                 35 minutes spent on direct patient care on day of discharge.      Washington Tobias MD  [facility] PGY-2  05/10/2019

## 2019-05-09 NOTE — PLAN OF CARE
Problem: Adult Inpatient Plan of Care  Goal: Plan of Care Review  Outcome: Ongoing (interventions implemented as appropriate)  Pt remains A+O, contact isolation continued pending c. Diff results. Pt started on diet this shift, tolerated well. Medicated for c/o stomach cramping with scheduled bentyl. IVF, IV abx administered per MAR. Telemetry remains in place, SB when pt is sleeping, arouses easily and HR noted to come up to 50s-70s SR. Cleared from PT/OT/ST standpoint, ambulatory independently. Will continue to monitor and intervene as needed.

## 2019-05-09 NOTE — PLAN OF CARE
Discharge rounds on patient. Discussed followup appointments, blue discharge folder, discharge nurse will go over home medications and reasons for medications and final discharge instructions. All patient/caregiver questions answered. Patient verbalized understanding.    TN met with patient. He states that maria l is coming to pick him up and bring him home. States that he will be going back to her house to stay for a little while. Pt informed on follow up appointment. No needs for HH or DME. Pt has no further questions.    Future Appointments   Date Time Provider Department Center   5/13/2019 10:00 AM Nixon Chairez PA-C [facility]        05/09/19 1229   Final Note   Assessment Type Final Discharge Note   Anticipated Discharge Disposition Home   What phone number can be called within the next 1-3 days to see how you are doing after discharge? [phone]   Hospital Follow Up  Appt(s) scheduled? Yes   Discharge plans and expectations educations in teach back method with documentation complete? Yes   Right Care Referral Info   Post Acute Recommendation No Care   Referral Type Home

## 2019-05-09 NOTE — PLAN OF CARE
Pt BP was slightly elvated, pt HR was in the 30's several times throughout the night. Pt was asymptomatic every time I went in to check. Pt complaint of some mild pain and was given his schedule bentyl. Pt had a loose, liquid green stool.

## 2019-05-09 NOTE — PROGRESS NOTES
Ambulatory referral to Gastroenterology [[id number]] (Order [id number])   Outpatient Referral   Date and Time: 2019  9:21 AM Department: [facility] Medical Surgical Unit Acute Rel By/Authorizing: Washington Tobias MD   Dept Order Information     Date Department   2019 [facility] Medical Surgical Unit Acute   Order Information     Order Date/Time Release Date/Time Start Date/Time End Date/Time   19 09:21 AM None 2019 None   Order Details     Frequency Duration Priority Order Class   None None Routine External Referral   Quantity     Ordering Quantity   1   Quantity     Ordering Quantity Ordering Quantity   1 1   Order Details     Order ID   [id number]   Reprint Order Requisition     Ambulatory referral to Gastroenterology (Order #[id number]) on 19   Associated Diagnoses      ICD-10-CM ICD-9-CM   Acute colitis  - Primary     K52.9 558.9   Vomiting and diarrhea     R11.10  R19.7 787.03  787.91   Collection Information     Acknowledgement Info     For At Acknowledged By Acknowledged On   Placing Order 19 Caitlyn Mckeon RN 1947   Patient Details   MRN:[id number]   Name Gender Identity  SSN   Edel Heller Male 1985       Address Phone Email Aliases   [address] [place] [address] Home: [phone]   Work:    Cell: [phone]           Inpatient Unit Inpatient Room Inpatient Bed    [facility] MEDICAL SURGICAL UNIT ACUTE K514 K514 A       PCP Allergies     Primary Doctor No No Known Allergies      Primary Visit Coverage     Payer Plan Sponsor Code Group Number Group Name   MEDICAID PENDING MEDICAID      Primary Visit Coverage subscriber     Subscriber ID Subscriber Name Subscriber Address   [id number] EDEL HELLER [address] [place] [id number]   Additional Information     Associated Reports   Priority and Order Details   ADT-Related Order Information    Encounter     View Encounter             Order Provider Info         Office phone Pager E-mail    Ordering User Washington Tobias [phone] -- [email or web address]   Authorizing Provider Washington Tobias [phone] -- --   Attending Provider Severyn Yaroshevsky, [phone] -- --   Ambulatory referral to Gastroenterology [[id number]]     Electronically signed by: Washington Tobias MD on 05/09/19 0921 Status: Active   Ordering user: Washington Tobias MD 05/09/19 0921 Ordering provider: Washington Tobias MD   Authorized by: Washington Tobias MD Ordering mode: Standard   Frequency:  05/09/19 -     Acknowledged: Caitlyn Mckeon RN 05/09/19 0948 for Placing Order   Diagnoses  Vomiting and diarrhea [R11.10, R19.7]  Acute colitis [K52.9]   Order Diagnosis: Vomiting and diarrhea [R11.10, R19.7 (ICD-10-CM)]  Acute colitis [K52.9 (ICD-10-CM)] CPT:                Electronically signed by: Washington Tobias MD Lic # [id number] NPI: [id number]

## 2019-05-09 NOTE — DISCHARGE INSTRUCTIONS
Abdominal Pain, Adult (English) View Edit Remove   Vomiting and Diarrhea, Self-Care for (English) View Edit Remove   Acetaminophen tablets or caplets (English) View Edit Remove   Dicyclomine tablets or capsules (English) View Edit Remove   Ondansetron tablets (English) View Edit Remove   Smoking Cessation (English) View Edit Remove   Ciprofloxacin tablets (English) View Edit Remove   Metronidazole tablets or capsules (English) View Edit Remove

## 2019-05-10 ENCOUNTER — PATIENT OUTREACH (OUTPATIENT)
Dept: ADMINISTRATIVE | Facility: CLINIC | Age: 34
End: 2019-05-10

## 2019-05-10 NOTE — PATIENT INSTRUCTIONS
Noninfectious Gastroenteritis (Ages 6 Years to Adult)    Gastroenteritis can cause nausea, vomiting, diarrhea, and abdominal cramping. This may occur as a result of food sensitivity, inflammation of your gastrointestinal tract, medicines, stress, or other causes not related to infection. Your symptoms will usually last from 1 to 3 days, but can last longer. Antibiotics are not effective, but simple home treatment will be helpful.  Home care  Medicine  · You may use acetaminophen or NSAID medicines like ibuprofen or naproxen to control fever, unless another medicine is prescribed. (Note: If you have chronic liver or kidney disease, or ever had a stomach ulcer or gastrointestinalI bleeding, talk with your healthcare provider before using these medicines.) Aspirin should never be used in anyone under 18 years of age who is ill with a fever. It may cause severe liver damage. Don't increase your NSAID medicines if you are already taking these medicines for another condition (like arthritis). Don't use NSAIDS if you are on aspirin (such as for heart disease, or after a stroke).  · If medicines for diarrhea or vomiting are prescribed, take only as directed.  General care and preventing spread of the illness  · If symptoms are severe, rest at home for the next 24 hours or until you feel better.  · Hand washing with soap and water is the best way to prevent the spread of infection. Wash your hands after touching anyone who is sick.  · Wash your hands after using the toilet and before meals. Clean the toilet after each use.  · Caffeine, tobacco, and alcohol can make your diarrhea, cramping, and pain worse.  Diet  · Water and clear liquids are important so you do not get dehydrated. Drink a small amount at a time.  · Do not force yourself to eat, especially if you have cramps, vomiting, or diarrhea. When you finally decide to start eating, do not eat large amounts at a time, even if you are hungry.  · If you eat, avoid  fatty, greasy, spicy, or fried foods.  · Do not eat dairy products if you have diarrhea; they can make the diarrhea worse.  During the first 24 hours (the first full day), follow the diet below:  · Beverages: Water, clear liquids, soft drinks without caffeine, like ginger ale; mineral water (plain or flavored); decaffeinated tea and coffee.  · Soups: Clear broth, consommé, and bouillon Sports drinks aren't a good choice because they have too much sugar and not enough electrolytes. In this case, commercially available products called oral rehydration solutions are best.  · Desserts: Plain gelatin, popsicles, and fruit juice bars.  During the next 24 hours (the second day), you may add the following to the above if you have improved. If not, continue what you did the first day:  · Hot cereal, plain toast, bread, rolls, crackers  · Plain noodles, rice, mashed potatoes, chicken noodle or rice soup  · Unsweetened canned fruit (avoid pineapple), bananas  · Limit caffeine and chocolate. No spices or seasonings except salt.  During the next 24 hours  · Gradually resume a normal diet, as you feel better and your symptoms improve.  · If at any time your symptoms start getting worse, go back to clear liquids until you feel better.  Food preparation  · If you have diarrhea, you should not prepare food for others. When you  prepare food for yourself, wash your hands before and after.  · Wash your hands after using cutting boards, countertops, and knives that have been in contact with raw food.  · Keep uncooked meats away from cooked and ready-to-eat foods.  Follow-up care  Follow up with your healthcare provider if you are not improving over the next 2 to 3 days, or as advised. If a stool (diarrhea) sample was taken, call for the results as directed.  When to seek medical care  Call your healthcare provider right away if any of these occur:   · Increasing abdominal pain or constant lower right abdominal pain  · Continued  vomiting (unable to keep liquids down)  · Frequent diarrhea (more than 5 times a day)  · Blood in vomit or stool (black or red color)  · Inability to tolerate solid food after a few days.  · Dark urine, reduced urine output  · Weakness, dizziness  · Drowsiness  · Fever of 100.4ºF (38.0ºC) or higher, or as directed by your healthcare provider  · New rash  Call 911  Call 911 if any of these occur:  · Trouble breathing  · Chest pain  · Confusion  · Severe drowsiness or trouble awakening  · Seizure  · Stiff neck  Date Last Reviewed: 11/16/2015  © 9799-0855 [employer]. [address] [place] [address], [place] [address]. All rights reserved. This information is not intended as a substitute for professional medical care. Always follow your healthcare professional's instructions.

## 2019-05-10 NOTE — PROGRESS NOTES
C3 nurse attempted to contact patient. No answer. The following message was left for the patient to return the call:  Good morning  I am a nurse calling on behalf of [facility] from the [facility].  This is a Transitional Care Call for Mario Heller. When you have a moment please contact us at [phone] or [phone] Monday through Friday, between the hours of 8 am to 4 pm. We look forward to speaking with you. On behalf of [facility] have a nice day.    The patient has a scheduled HOS appointment with Nixon Chairez PA-C on 5/13/2019  at 10:00 am

## 2019-05-11 LAB — BACTERIA STL CULT: NORMAL

## 2019-05-13 LAB
BACTERIA BLD CULT: NORMAL
BACTERIA BLD CULT: NORMAL
TTG IGA SER-ACNC: 7 UNITS

## 2021-01-14 ENCOUNTER — LAB VISIT (OUTPATIENT)
Dept: PRIMARY CARE CLINIC | Facility: OTHER | Age: 36
End: 2021-01-14
Attending: INTERNAL MEDICINE
Payer: OTHER GOVERNMENT

## 2021-01-14 DIAGNOSIS — Z20.822 ENCOUNTER FOR LABORATORY TESTING FOR COVID-19 VIRUS: ICD-10-CM

## 2021-01-14 PROCEDURE — U0003 INFECTIOUS AGENT DETECTION BY NUCLEIC ACID (DNA OR RNA); SEVERE ACUTE RESPIRATORY SYNDROME CORONAVIRUS 2 (SARS-COV-2) (CORONAVIRUS DISEASE [COVID-19]), AMPLIFIED PROBE TECHNIQUE, MAKING USE OF HIGH THROUGHPUT TECHNOLOGIES AS DESCRIBED BY CMS-2020-01-R: HCPCS

## 2021-01-15 DIAGNOSIS — U07.1 COVID-19 VIRUS DETECTED: ICD-10-CM

## 2021-01-15 LAB — SARS-COV-2 RNA RESP QL NAA+PROBE: DETECTED

## 2021-01-16 ENCOUNTER — NURSE TRIAGE (OUTPATIENT)
Dept: ADMINISTRATIVE | Facility: CLINIC | Age: 36
End: 2021-01-16

## 2021-04-15 ENCOUNTER — PATIENT MESSAGE (OUTPATIENT)
Dept: RESEARCH | Facility: HOSPITAL | Age: 36
End: 2021-04-15

## 2024-10-25 ENCOUNTER — PATIENT MESSAGE (OUTPATIENT)
Dept: RESEARCH | Facility: HOSPITAL | Age: 39
End: 2024-10-25
Payer: MEDICAID